# Patient Record
Sex: MALE | Race: WHITE | HISPANIC OR LATINO | Employment: OTHER | URBAN - METROPOLITAN AREA
[De-identification: names, ages, dates, MRNs, and addresses within clinical notes are randomized per-mention and may not be internally consistent; named-entity substitution may affect disease eponyms.]

---

## 2017-12-04 ENCOUNTER — HOSPITAL ENCOUNTER (EMERGENCY)
Facility: MEDICAL CENTER | Age: 67
End: 2017-12-04
Attending: EMERGENCY MEDICINE
Payer: MEDICARE

## 2017-12-04 VITALS
OXYGEN SATURATION: 92 % | WEIGHT: 126.76 LBS | DIASTOLIC BLOOD PRESSURE: 76 MMHG | BODY MASS INDEX: 24.89 KG/M2 | RESPIRATION RATE: 16 BRPM | HEIGHT: 60 IN | HEART RATE: 98 BPM | SYSTOLIC BLOOD PRESSURE: 138 MMHG | TEMPERATURE: 97.7 F

## 2017-12-04 DIAGNOSIS — R04.0 LEFT-SIDED NOSEBLEED: ICD-10-CM

## 2017-12-04 PROCEDURE — 99283 EMERGENCY DEPT VISIT LOW MDM: CPT

## 2017-12-04 ASSESSMENT — PAIN SCALES - GENERAL
PAINLEVEL_OUTOF10: 0
PAINLEVEL_OUTOF10: 0

## 2017-12-04 NOTE — ED NOTES
Discharge information provided. Pt verbalized understanding of discharge instructions to follow up with PCP and to return to ER if condition worsens. Pt ambulated out of ER in a steady gait, no additional questions or concerns. No new medications

## 2017-12-04 NOTE — ED PROVIDER NOTES
ED Provider Note    CHIEF COMPLAINT  Chief Complaint   Patient presents with   • Nose Bleed     Since last night at 2000, does not take anticoagulants         HPI    Primary care provider: Pcp Pt States None   History obtained from:Patient and his wife  History limited by: None     Louis Candelario is a 67 y.o. male who presents to the ED complaining of bleeding from the left nostril for 2 days. Wife states that he has had congestion and cough recently along with subjective fever and he started having intermittent bleeding from the left nostril. Denies any injury/trauma. Patient is not on any blood thinners. Wife reports patient with history of nosebleeds in the past. Patient denies any other symptoms.    REVIEW OF SYSTEMS  Please see HPI for pertinent positives/negatives.     PAST MEDICAL HISTORY  Past Medical History:   Diagnosis Date   • Hypertension         SURGICAL HISTORY  History reviewed. No pertinent surgical history.     SOCIAL HISTORY  Social History     Social History Main Topics   • Smoking status: Never Smoker   • Smokeless tobacco: Never Used   • Alcohol use Yes      Comment: occasional   • Drug use: No   • Sexual activity: Not on file        FAMILY HISTORY  History reviewed. No pertinent family history.     CURRENT MEDICATIONS  Home Medications     Reviewed by Dahlia Woods R.N. (Registered Nurse) on 12/04/17 at 0205  Med List Status: Unable to Obtain   Medication Last Dose Status        Patient Jose Manuel Taking any Medications                        ALLERGIES  No Known Allergies     PHYSICAL EXAM  VITAL SIGNS: /76   Pulse 98   Temp 36.5 °C (97.7 °F)   Resp 16   Ht 1.524 m (5')   Wt 57.5 kg (126 lb 12.2 oz)   SpO2 92%   BMI 24.76 kg/m²  @SHAVON[[id number]::@     Pulse ox interpretation:92% I interpret this pulse ox as borderline    Constitutional: Well developed, well nourished, alert in no apparent distress, nontoxic appearance   HENT: No external signs of trauma, normocephalic, no blood or  bleeding noted in both nares, oropharynx moist and clear without any blood or bleeding   Eyes: No discharge, no icterus   Neck: Trachea midline, no stridor   Cardiovascular: Strong distal pulses and good perfusion   Thorax & Lungs: No respiratory distress   Extremities: No clubbing, no cyanosis, no edema, no gross deformity, good ROM, no tenderness, intact distal pulses with brisk cap refill   Skin: Warm, dry, no pallor/cyanosis, no rash noted   Neuro: A/O times 3, no focal deficits noted   Psychiatric: Cooperative        DIAGNOSTIC STUDIES / PROCEDURES        LABS  All labs reviewed by me.     Results for orders placed or performed during the hospital encounter of 12/05/16   CBC WITH DIFFERENTIAL   Result Value Ref Range    WBC 5.9 4.8 - 10.8 K/uL    RBC 5.15 4.70 - 6.10 M/uL    Hemoglobin 15.8 14.0 - 18.0 g/dL    Hematocrit 46.4 42.0 - 52.0 %    MCV 90.1 81.4 - 97.8 fL    MCH 30.7 27.0 - 33.0 pg    MCHC 34.1 33.7 - 35.3 g/dL    RDW 38.5 35.9 - 50.0 fL    Platelet Count 207 164 - 446 K/uL    MPV 9.4 9.0 - 12.9 fL    Neutrophils-Polys 79.20 (H) 44.00 - 72.00 %    Lymphocytes 11.40 (L) 22.00 - 41.00 %    Monocytes 8.90 0.00 - 13.40 %    Eosinophils 0.00 0.00 - 6.90 %    Basophils 0.30 0.00 - 1.80 %    Immature Granulocytes 0.20 0.00 - 0.90 %    Nucleated RBC 0.00 /100 WBC    Neutrophils (Absolute) 4.64 1.82 - 7.42 K/uL    Lymphs (Absolute) 0.67 (L) 1.00 - 4.80 K/uL    Monos (Absolute) 0.52 0.00 - 0.85 K/uL    Eos (Absolute) 0.00 0.00 - 0.51 K/uL    Baso (Absolute) 0.02 0.00 - 0.12 K/uL    Immature Granulocytes (abs) 0.01 0.00 - 0.11 K/uL    NRBC (Absolute) 0.00 K/uL   COMP METABOLIC PANEL   Result Value Ref Range    Sodium 138 135 - 145 mmol/L    Potassium 3.3 (L) 3.6 - 5.5 mmol/L    Chloride 101 96 - 112 mmol/L    Co2 24 20 - 33 mmol/L    Anion Gap 13.0 (H) 0.0 - 11.9    Glucose 101 (H) 65 - 99 mg/dL    Bun 8 8 - 22 mg/dL    Creatinine 0.93 0.50 - 1.40 mg/dL    Calcium 9.0 8.4 - 10.2 mg/dL    AST(SGOT) 23 12 - 45  U/L    ALT(SGPT) 16 2 - 50 U/L    Alkaline Phosphatase 93 30 - 99 U/L    Total Bilirubin 0.6 0.1 - 1.5 mg/dL    Albumin 4.5 3.2 - 4.9 g/dL    Total Protein 8.2 6.0 - 8.2 g/dL    Globulin 3.7 (H) 1.9 - 3.5 g/dL    A-G Ratio 1.2 g/dL   TROPONIN   Result Value Ref Range    Troponin I <0.02 0.00 - 0.04 ng/mL   BTYPE NATRIURETIC PEPTIDE   Result Value Ref Range    B Natriuretic Peptide 21 0 - 100 pg/mL   BLOOD CULTURE x2   Result Value Ref Range    Significant Indicator NEG     Source BLD     Site PERIPHERAL     Blood Culture No growth after 5 days of incubation.    BLOOD CULTURE x2   Result Value Ref Range    Significant Indicator NEG     Source BLD     Site PERIPHERAL     Blood Culture No growth after 5 days of incubation.    INFLUENZA RAPID   Result Value Ref Range    Significant Indicator NEG     Source RESP     Site RESPIRATORY Nasopharyngeal     Rapid Influenza A-B       Negative for Influenza A and Influenza B antigens.  Infection due to influenza A or B cannot be ruled out  since the antigen present in the specimen may be below the  detection limit of the test. Culture confirmation of  negative samples is recommended.     LACTIC ACID   Result Value Ref Range    Lactic Acid 2.93 (H) 0.50 - 2.00 mmol/L    Specimen Venous    ESTIMATED GFR   Result Value Ref Range    GFR If African American >60 >60 mL/min/1.73 m 2    GFR If Non African American >60 >60 mL/min/1.73 m 2   EKG (ER)   Result Value Ref Range    Report       [facility] Emergency Dept.    Test Date:  2016  Pt Name:    KENNY CULP                Department: [facility]  MRN:        [id number]                      [facility]  Gender:     M                            Technician: GEORGE  :        1950                   Requested By:NORA RAMIREZ  Order #:    [id number]                    Reading MD: NORA RAMIREZ MD    Measurements  Intervals                                Axis  Rate:       95                            P:          37  PA:         176                          QRS:        29  QRSD:       92                           T:          18  QT:         360  QTc:        453    Interpretive Statements  SINUS RHYTHM  No previous ECG available for comparison    Electronically Signed On 12-5-2016 16:03:56 PST by ONRA RAMIREZ MD          RADIOLOGY  The radiologist's interpretation of all radiological studies have been reviewed by me.     No orders to display          COURSE & MEDICAL DECISION MAKING  Nursing notes, VS, PMSFHx reviewed in chart.       Patient presents with his wife to the ED with above complaint. Nasal clamp was applied by triage nurse. On my exam, the patient with no blood or bleeding in both nares. Findings discussed with the patient and his wife. He appears to be having URI symptoms which is likely causing his intermittent nosebleeds. He will be sent home with a nasal clamp and I discussed with them home treatment for epistaxis. They were given return to ED precautions. Patient otherwise noted to be in no acute distress and nontoxic in appearance.  They verbalized understanding and agreed with plan of care with no further questions or concerns.      The patient is referred to a primary physician for blood pressure management, diabetic screening, and for all other preventative health concerns.       FINAL IMPRESSION  1. Left-sided nosebleed           DISPOSITION  Patient will be discharged home in stable condition.       FOLLOW UP  [address] [address] [phone]  Call in 1 day      [facility], Emergency Dept  [address]  [facility] [id number] [phone]    If symptoms worsen         OUTPATIENT MEDICATIONS  New Prescriptions    No medications on file          Electronically signed by: Devon Lagos, 12/4/2017 2:12 AM      Portions of this record were made with voice recognition software.  Despite my review, spelling/grammar/context  errors may still remain.  Interpretation of this chart should be taken in this context.

## 2017-12-04 NOTE — ED NOTES
Chief Complaint   Patient presents with   • Nose Bleed     Since last night at 2000, does not take anticoagulants

## 2019-07-15 ENCOUNTER — NON-PROVIDER VISIT (OUTPATIENT)
Dept: OCCUPATIONAL MEDICINE | Facility: CLINIC | Age: 69
End: 2019-07-15

## 2019-07-15 DIAGNOSIS — Z11.1 ENCOUNTER FOR PPD TEST: Primary | ICD-10-CM

## 2019-07-15 PROCEDURE — 86580 TB INTRADERMAL TEST: CPT | Performed by: PREVENTIVE MEDICINE

## 2019-07-17 ENCOUNTER — NON-PROVIDER VISIT (OUTPATIENT)
Dept: OCCUPATIONAL MEDICINE | Facility: CLINIC | Age: 69
End: 2019-07-17

## 2019-07-17 DIAGNOSIS — Z11.1 ENCOUNTER FOR PPD SKIN TEST READING: ICD-10-CM

## 2019-07-17 LAB — TB WHEAL 3D P 5 TU DIAM: NORMAL MM

## 2019-11-11 ENCOUNTER — HOSPITAL ENCOUNTER (EMERGENCY)
Facility: MEDICAL CENTER | Age: 69
End: 2019-11-11
Attending: EMERGENCY MEDICINE
Payer: MEDICARE

## 2019-11-11 VITALS
TEMPERATURE: 98.5 F | HEIGHT: 60 IN | BODY MASS INDEX: 25.02 KG/M2 | RESPIRATION RATE: 16 BRPM | OXYGEN SATURATION: 97 % | WEIGHT: 127.43 LBS | SYSTOLIC BLOOD PRESSURE: 119 MMHG | HEART RATE: 82 BPM | DIASTOLIC BLOOD PRESSURE: 85 MMHG

## 2019-11-11 DIAGNOSIS — R04.0 EPISTAXIS: ICD-10-CM

## 2019-11-11 PROCEDURE — 700101 HCHG RX REV CODE 250

## 2019-11-11 PROCEDURE — 700102 HCHG RX REV CODE 250 W/ 637 OVERRIDE(OP)

## 2019-11-11 PROCEDURE — 99283 EMERGENCY DEPT VISIT LOW MDM: CPT

## 2019-11-11 PROCEDURE — 303620 HCHG EPISTAXIS CONTROL

## 2019-11-11 PROCEDURE — A9270 NON-COVERED ITEM OR SERVICE: HCPCS

## 2019-11-11 RX ORDER — EPINEPHRINE NASAL SOLUTION 1 MG/ML
SOLUTION NASAL
Status: COMPLETED
Start: 2019-11-11 | End: 2019-11-11

## 2019-11-11 RX ORDER — LIDOCAINE HYDROCHLORIDE 20 MG/ML
JELLY TOPICAL
Status: COMPLETED
Start: 2019-11-11 | End: 2019-11-11

## 2019-11-11 RX ADMIN — BENZOCAINE, BUTAMBEN, AND TETRACAINE HYDROCHLORIDE: .028; .004; .004 AEROSOL, SPRAY TOPICAL at 18:30

## 2019-11-11 RX ADMIN — Medication: at 19:20

## 2019-11-11 RX ADMIN — Medication: at 18:30

## 2019-11-12 NOTE — ED TRIAGE NOTES
Louis Candelario  69 y.o.  Chief Complaint   Patient presents with   • Nose Bleed     states always has minor bleeding in the morning, but today has been increased all day, had a sx on nose many years ago to correct bleeding     Patient ambulatory with steady gait to triage room with above complaint.  Patient appears in mild discomfort, has nasal clip in place, no leaking noted.  Presents with bag of blood tissues.  Denies pain    Patient escorted to the lobby and instructed to notify staff of any changes in condition.

## 2019-11-12 NOTE — ED NOTES
Pt discharged home. Explained discharge and medication instructions. Questions and comments addressed. Pt verbalized understanding of instructions. Pt advised to follow-up with ENT or return to ED for any new or worsening of symptoms. Pt is ambulating well and steady on feet. VS stable. Pt's family at bedside and will be driving pt home.

## 2019-11-12 NOTE — ED PROVIDER NOTES
ED Provider Note    Scribed for Armida Oconnell M.D. by Yrn Cottrell. 11/11/2019, 6:11 PM.    Primary care provider: Pcp Pt States None  Means of arrival: Walk In  History obtained from: Patient  History limited by: None    CHIEF COMPLAINT  Chief Complaint   Patient presents with   • Nose Bleed     states always has minor bleeding in the morning, but today has been increased all day, had a sx on nose many years ago to correct bleeding       HPI  Louis Candelario is a 69 y.o. male who presents to the Emergency Department for evaluation of a nose bleed which onset earlier this morning however has persisted throughout the day. Patient had a cauterization procedure performed 15-20 years ago, and has not had any problems with nosebleeds since. For the last week he has been having stuttering bleeding, and today was unable to stop the bleeding. The right nare started bleeding just before 7 AM, and upon arrival here, both nares were noted to be bleeding. Patient has had a nasal clamp in place for the last hour. Given the persistent bleeding, he has come here for evaluation and treatment. He denies any lightheadedness, syncope, nausea, and vomiting, and is not anticoagulated.    REVIEW OF SYSTEMS  Pertinent positives include epistaxis,.   Pertinent negatives include no lightheadedness, syncope, nausea, vomiting.    See HPI for further details.     PAST MEDICAL HISTORY  Past Medical History:   Diagnosis Date   • Hypertension        SURGICAL HISTORY  Past Surgical History:   Procedure Laterality Date   • HERNIA REPAIR     • NASAL CAUTERIZATION         SOCIAL HISTORY  Social History     Tobacco Use   • Smoking status: Never Smoker   • Smokeless tobacco: Never Used   Substance Use Topics   • Alcohol use: Yes     Comment: occasional   • Drug use: No      Social History     Substance and Sexual Activity   Drug Use No       FAMILY HISTORY  History reviewed. No pertinent family history.    CURRENT  MEDICATIONS  None      ALLERGIES  No Known Allergies    PHYSICAL EXAM  VITAL SIGNS: /89   Pulse 87   Temp 36.8 °C (98.2 °F) (Temporal)   Resp 16   Ht 1.524 m (5')   Wt 57.8 kg (127 lb 6.8 oz)   SpO2 96%   BMI 24.89 kg/m²   Vitals reviewed.    Consitutional: Well-developed, well-nourished. Negative for: distress.  HENT: Normocephalic, right external ear normal, left external ear normal, oropharynx clear and moist. Right medial septum area of excoriation that is not currently oozing, no blood in the posterior oropharynx at this time  Eyes: Conjunctivae normal, extraocular movements normal. Negative for: discharge in right and left eye, icterus.  Neck: Range of motion normal, supple.   Musculoskeletal: Normal range of motion. Negative for edema.  Neurological: Alert and oriented x3. No focal deficits.  Skin: Warm, dry. Negative for rash.  Psych: Mood/affect normal, behavior normal, judgment normal.      DIAGNOSTIC STUDIES / PROCEDURES    Epistaxis Procedure Note    Indication: Bleeding    Pre-medication: viscous lidocaine    Procedure: The patient was positioned appropriately and the nares were cleared as well as possible. The bleeding site was localized to the right nare in the middle region and tamponaded with a Merocel nasal tampon after silver nitrate failed.  Hemostasis was obtained.    The patient tolerated the procedure well.    Complications: None      COURSE & MEDICAL DECISION MAKING  Nursing notes, VS, PMSFHx reviewed in chart.    6:11 PM Patient seen and examined at bedside. The patient presents with nosebleeding despite clamping. Epistaxis procedure performed by myself at this time.    7:35 PM  - Patient reassessed for fourth time, who is resting comfortably and is no longer bleeding. The plan outpatient care was discussed and the patient will be discharged home. He understands and agrees.    The patient will return for new or worsening symptoms and is stable at the time of  discharge.    DISPOSITION:  Patient will be discharged home in stable condition.    FOLLOW UP:  Simba Harris M.D.  [address] [facility] [address]  [phone]    Schedule an appointment as soon as possible for a visit       [facility], Emergency Dept  [address] [facility] [address] [phone]    As needed, If symptoms worsen      FINAL IMPRESSION  1. Epistaxis          IYrn (Scribe), am scribing for, and in the presence of, Armida Oconnell M.D..    Electronically signed by: Yrn Cottrell (Scribe), 11/11/2019    Armida BRUMFIELD M.D. personally performed the services described in this documentation, as scribed by Yrn Cottrell in my presence, and it is both accurate and complete. E.    The note accurately reflects work and decisions made by me.  Armida Oconnell  11/11/2019  7:35 PM

## 2019-11-14 ENCOUNTER — HOSPITAL ENCOUNTER (EMERGENCY)
Facility: MEDICAL CENTER | Age: 69
End: 2019-11-14
Attending: EMERGENCY MEDICINE
Payer: MEDICARE

## 2019-11-14 ENCOUNTER — APPOINTMENT (OUTPATIENT)
Dept: RADIOLOGY | Facility: MEDICAL CENTER | Age: 69
End: 2019-11-14
Attending: EMERGENCY MEDICINE
Payer: MEDICARE

## 2019-11-14 VITALS
DIASTOLIC BLOOD PRESSURE: 56 MMHG | OXYGEN SATURATION: 94 % | TEMPERATURE: 98.8 F | RESPIRATION RATE: 26 BRPM | HEIGHT: 60 IN | HEART RATE: 82 BPM | SYSTOLIC BLOOD PRESSURE: 116 MMHG | WEIGHT: 124.56 LBS | BODY MASS INDEX: 24.45 KG/M2

## 2019-11-14 DIAGNOSIS — J18.9 PNEUMONIA DUE TO INFECTIOUS ORGANISM, UNSPECIFIED LATERALITY, UNSPECIFIED PART OF LUNG: ICD-10-CM

## 2019-11-14 DIAGNOSIS — R05.9 COUGH: ICD-10-CM

## 2019-11-14 LAB
ALBUMIN SERPL BCP-MCNC: 4.4 G/DL (ref 3.2–4.9)
ALBUMIN/GLOB SERPL: 1.8 G/DL
ALP SERPL-CCNC: 70 U/L (ref 30–99)
ALT SERPL-CCNC: 8 U/L (ref 2–50)
ANION GAP SERPL CALC-SCNC: 11 MMOL/L (ref 0–11.9)
APPEARANCE UR: CLEAR
AST SERPL-CCNC: 15 U/L (ref 12–45)
BACTERIA #/AREA URNS HPF: NEGATIVE /HPF
BASOPHILS # BLD AUTO: 0.4 % (ref 0–1.8)
BASOPHILS # BLD: 0.03 K/UL (ref 0–0.12)
BILIRUB SERPL-MCNC: 1.1 MG/DL (ref 0.1–1.5)
BILIRUB UR QL STRIP.AUTO: NEGATIVE
BUN SERPL-MCNC: 12 MG/DL (ref 8–22)
CALCIUM SERPL-MCNC: 8.8 MG/DL (ref 8.5–10.5)
CHLORIDE SERPL-SCNC: 107 MMOL/L (ref 96–112)
CO2 SERPL-SCNC: 23 MMOL/L (ref 20–33)
COLOR UR: ABNORMAL
CREAT SERPL-MCNC: 0.91 MG/DL (ref 0.5–1.4)
EOSINOPHIL # BLD AUTO: 0.01 K/UL (ref 0–0.51)
EOSINOPHIL NFR BLD: 0.1 % (ref 0–6.9)
EPI CELLS #/AREA URNS HPF: NEGATIVE /HPF
ERYTHROCYTE [DISTWIDTH] IN BLOOD BY AUTOMATED COUNT: 39.1 FL (ref 35.9–50)
FLUAV RNA SPEC QL NAA+PROBE: NEGATIVE
FLUBV RNA SPEC QL NAA+PROBE: NEGATIVE
GLOBULIN SER CALC-MCNC: 2.5 G/DL (ref 1.9–3.5)
GLUCOSE SERPL-MCNC: 119 MG/DL (ref 65–99)
GLUCOSE UR STRIP.AUTO-MCNC: NEGATIVE MG/DL
HCT VFR BLD AUTO: 38.7 % (ref 42–52)
HGB BLD-MCNC: 13.1 G/DL (ref 14–18)
HYALINE CASTS #/AREA URNS LPF: ABNORMAL /LPF
IMM GRANULOCYTES # BLD AUTO: 0.04 K/UL (ref 0–0.11)
IMM GRANULOCYTES NFR BLD AUTO: 0.5 % (ref 0–0.9)
KETONES UR STRIP.AUTO-MCNC: 15 MG/DL
LACTATE BLD-SCNC: 1.2 MMOL/L (ref 0.5–2)
LEUKOCYTE ESTERASE UR QL STRIP.AUTO: ABNORMAL
LYMPHOCYTES # BLD AUTO: 0.86 K/UL (ref 1–4.8)
LYMPHOCYTES NFR BLD: 10.1 % (ref 22–41)
MCH RBC QN AUTO: 31.1 PG (ref 27–33)
MCHC RBC AUTO-ENTMCNC: 33.9 G/DL (ref 33.7–35.3)
MCV RBC AUTO: 91.9 FL (ref 81.4–97.8)
MICRO URNS: ABNORMAL
MONOCYTES # BLD AUTO: 0.63 K/UL (ref 0–0.85)
MONOCYTES NFR BLD AUTO: 7.4 % (ref 0–13.4)
NEUTROPHILS # BLD AUTO: 6.98 K/UL (ref 1.82–7.42)
NEUTROPHILS NFR BLD: 81.5 % (ref 44–72)
NITRITE UR QL STRIP.AUTO: NEGATIVE
NRBC # BLD AUTO: 0 K/UL
NRBC BLD-RTO: 0 /100 WBC
NT-PROBNP SERPL IA-MCNC: 93 PG/ML (ref 0–125)
PH UR STRIP.AUTO: 6.5 [PH] (ref 5–8)
PLATELET # BLD AUTO: 213 K/UL (ref 164–446)
PMV BLD AUTO: 9.6 FL (ref 9–12.9)
POTASSIUM SERPL-SCNC: 3.7 MMOL/L (ref 3.6–5.5)
PROCALCITONIN SERPL-MCNC: 0.06 NG/ML
PROT SERPL-MCNC: 6.9 G/DL (ref 6–8.2)
PROT UR QL STRIP: 100 MG/DL
RBC # BLD AUTO: 4.21 M/UL (ref 4.7–6.1)
RBC # URNS HPF: ABNORMAL /HPF
RBC UR QL AUTO: NEGATIVE
SODIUM SERPL-SCNC: 141 MMOL/L (ref 135–145)
SP GR UR STRIP.AUTO: 1.03
UROBILINOGEN UR STRIP.AUTO-MCNC: 1 MG/DL
WBC # BLD AUTO: 8.6 K/UL (ref 4.8–10.8)
WBC #/AREA URNS HPF: ABNORMAL /HPF

## 2019-11-14 PROCEDURE — 83880 ASSAY OF NATRIURETIC PEPTIDE: CPT

## 2019-11-14 PROCEDURE — 700111 HCHG RX REV CODE 636 W/ 250 OVERRIDE (IP): Performed by: EMERGENCY MEDICINE

## 2019-11-14 PROCEDURE — 99285 EMERGENCY DEPT VISIT HI MDM: CPT

## 2019-11-14 PROCEDURE — 81001 URINALYSIS AUTO W/SCOPE: CPT

## 2019-11-14 PROCEDURE — 700102 HCHG RX REV CODE 250 W/ 637 OVERRIDE(OP): Performed by: EMERGENCY MEDICINE

## 2019-11-14 PROCEDURE — 87502 INFLUENZA DNA AMP PROBE: CPT

## 2019-11-14 PROCEDURE — A9270 NON-COVERED ITEM OR SERVICE: HCPCS | Performed by: EMERGENCY MEDICINE

## 2019-11-14 PROCEDURE — 96365 THER/PROPH/DIAG IV INF INIT: CPT

## 2019-11-14 PROCEDURE — 700105 HCHG RX REV CODE 258: Performed by: EMERGENCY MEDICINE

## 2019-11-14 PROCEDURE — 96375 TX/PRO/DX INJ NEW DRUG ADDON: CPT

## 2019-11-14 PROCEDURE — 83605 ASSAY OF LACTIC ACID: CPT

## 2019-11-14 PROCEDURE — 87086 URINE CULTURE/COLONY COUNT: CPT

## 2019-11-14 PROCEDURE — 84145 PROCALCITONIN (PCT): CPT

## 2019-11-14 PROCEDURE — 87040 BLOOD CULTURE FOR BACTERIA: CPT | Mod: 91

## 2019-11-14 PROCEDURE — 80053 COMPREHEN METABOLIC PANEL: CPT

## 2019-11-14 PROCEDURE — 36415 COLL VENOUS BLD VENIPUNCTURE: CPT

## 2019-11-14 PROCEDURE — 71045 X-RAY EXAM CHEST 1 VIEW: CPT

## 2019-11-14 PROCEDURE — 85025 COMPLETE CBC W/AUTO DIFF WBC: CPT

## 2019-11-14 RX ORDER — AZITHROMYCIN 500 MG/1
500 INJECTION, POWDER, LYOPHILIZED, FOR SOLUTION INTRAVENOUS ONCE
Status: COMPLETED | OUTPATIENT
Start: 2019-11-14 | End: 2019-11-14

## 2019-11-14 RX ORDER — ACETAMINOPHEN 325 MG/1
650 TABLET ORAL ONCE
Status: COMPLETED | OUTPATIENT
Start: 2019-11-14 | End: 2019-11-14

## 2019-11-14 RX ORDER — AZITHROMYCIN 250 MG/1
TABLET, FILM COATED ORAL
Qty: 6 TAB | Refills: 0 | Status: SHIPPED | OUTPATIENT
Start: 2019-11-14 | End: 2020-01-01

## 2019-11-14 RX ADMIN — CEFTRIAXONE SODIUM 1 G: 1 INJECTION, POWDER, FOR SOLUTION INTRAMUSCULAR; INTRAVENOUS at 10:39

## 2019-11-14 RX ADMIN — AZITHROMYCIN MONOHYDRATE 500 MG: 500 INJECTION, POWDER, LYOPHILIZED, FOR SOLUTION INTRAVENOUS at 10:15

## 2019-11-14 RX ADMIN — ACETAMINOPHEN 650 MG: 325 TABLET, FILM COATED ORAL at 09:56

## 2019-11-14 NOTE — ED TRIAGE NOTES
"Louis Candelario 69 y.o. male ambulatory w/ slow steady gait to triage with daughter for     Chief Complaint   Patient presents with   • Epistaxis     pt's daughter reports patient seen here 2 days ago for epistaxis and was cauterized and d/c'd with referral to ENT, who has not called them back yet.  Daughter reports intermittent nosebleeds since 2 days ago.   • Cough     daughter reports patient was awake all night because he cannot lay down or has \"cough attacks\" until he is coughing up blood   • Blood in Sputum   • Weakness     daughter reports patient is taking very little food or fluid c/o nausea.  She reports he has eaten very little in 3 days   • Nausea   • Headache     /72   Pulse 99   Temp 37.5 °C (99.5 °F) (Temporal)   Resp 20   Ht 1.524 m (5')   Wt 56.5 kg (124 lb 9 oz)   SpO2 94%   BMI 24.33 kg/m²    Pt Brazilian speaking, requesting daughter to translate rather than language ipad.  Pt placed in the senior lobby to await bed assignment.  Advised to return to the triage desk for any changes/concerns.  "

## 2019-11-14 NOTE — ED NOTES
Patient arrives complaining of 3 day history of nausea and headache. Reports was seen recently for nosebleed and discharged home. Poor appetite per family.

## 2019-11-14 NOTE — ED PROVIDER NOTES
"ED Provider Note    CHIEF COMPLAINT  Chief Complaint   Patient presents with   • Epistaxis     pt's daughter reports patient seen here 2 days ago for epistaxis and was cauterized and d/c'd with referral to ENT, who has not called them back yet.  Daughter reports intermittent nosebleeds since 2 days ago.   • Cough     daughter reports patient was awake all night because he cannot lay down or has \"cough attacks\" until he is coughing up blood   • Blood in Sputum   • Weakness     daughter reports patient is taking very little food or fluid c/o nausea.  She reports he has eaten very little in 3 days   • Nausea   • Headache       HPI  Louis Candelario is a 69 y.o. male who presents to the emergency department multiple complaints.  Patient was here couple days ago for epistaxis.  This was treated and packed.  The patient was discharged home but reports and the fact that has fallen out.  The patient has had a cough and cold symptoms for the last several days.  The patient has had a cough.  This is been associate with shortness of breath.  Cough is productive of yellow sputum.  He had some associated fevers and chills.  He has fairly significant shortness of breath when he walks.  He feels achy diffusely.  He denies any chest discomfort.  No nausea vomiting abdominal pain.  No other aggravating leaving factors or associated complaints.  Denies any history of PE DVT or heart disease.      The patient speaks Occitan.  His family served as an .  I have offered the [employer] language line however they refused insisting to be the patient's .    REVIEW OF SYSTEMS  See HPI for further details. All other systems are negative.    PAST MEDICAL HISTORY  Past Medical History:   Diagnosis Date   • Hypertension        FAMILY HISTORY  No family history on file.    SOCIAL HISTORY  Social History     Socioeconomic History   • Marital status: Legally      Spouse name: Not on file   • Number of children: Not on " file   • Years of education: Not on file   • Highest education level: Not on file   Occupational History   • Not on file   Social Needs   • Financial resource strain: Not on file   • Food insecurity:     Worry: Not on file     Inability: Not on file   • Transportation needs:     Medical: Not on file     Non-medical: Not on file   Tobacco Use   • Smoking status: Never Smoker   • Smokeless tobacco: Never Used   Substance and Sexual Activity   • Alcohol use: Yes     Comment: occasional   • Drug use: No   • Sexual activity: Not on file   Lifestyle   • Physical activity:     Days per week: Not on file     Minutes per session: Not on file   • Stress: Not on file   Relationships   • Social connections:     Talks on phone: Not on file     Gets together: Not on file     Attends Rastafarian service: Not on file     Active member of club or organization: Not on file     Attends meetings of clubs or organizations: Not on file     Relationship status: Not on file   • Intimate partner violence:     Fear of current or ex partner: Not on file     Emotionally abused: Not on file     Physically abused: Not on file     Forced sexual activity: Not on file   Other Topics Concern   • Not on file   Social History Narrative   • Not on file       SURGICAL HISTORY  Past Surgical History:   Procedure Laterality Date   • HERNIA REPAIR     • NASAL CAUTERIZATION         CURRENT MEDICATIONS  Home Medications    **Home medications have not yet been reviewed for this encounter**     Patient takes medicine for hypertension.  Does not recall the name    ALLERGIES  No Known Allergies    PHYSICAL EXAM  VITAL SIGNS: /72   Pulse 99   Temp (!) 38.4 °C (101.2 °F) (Oral)   Resp 20   Ht 1.524 m (5')   Wt 56.5 kg (124 lb 9 oz)   SpO2 94%   BMI 24.33 kg/m²    Constitutional: Well developed, Well nourished, No acute distress, Non-toxic appearance.   HENT: Normocephalic, Atraumatic, Bilateral external ears normal, Oropharynx moist, No oral exudates,  Nose normal.  Pharyngeal erythema no exudates  Eyes: PERRL, EOMI, Conjunctiva normal, No discharge.   Neck: Normal range of motion, No tenderness, Supple, No stridor.   Cardiovascular: Normal heart rate, Normal rhythm, No murmurs, No rubs, No gallops.   Thorax & Lungs: Crackles in both bases.  Good air movement  Abdomen: Bowel sounds normal, Soft, No tenderness   Skin: Warm, Dry, No erythema, No rash.   Back: No tenderness, No CVA tenderness.   Musculoskeletal: Good range of motion in all major joints.  Neurologic: Alert & oriented x 3, No focal deficits noted.   Psychiatric: Affect normal      Results for orders placed or performed during the hospital encounter of 11/14/19   LACTIC ACID   Result Value Ref Range    Lactic Acid 1.2 0.5 - 2.0 mmol/L   CBC WITH DIFFERENTIAL   Result Value Ref Range    WBC 8.6 4.8 - 10.8 K/uL    RBC 4.21 (L) 4.70 - 6.10 M/uL    Hemoglobin 13.1 (L) 14.0 - 18.0 g/dL    Hematocrit 38.7 (L) 42.0 - 52.0 %    MCV 91.9 81.4 - 97.8 fL    MCH 31.1 27.0 - 33.0 pg    MCHC 33.9 33.7 - 35.3 g/dL    RDW 39.1 35.9 - 50.0 fL    Platelet Count 213 164 - 446 K/uL    MPV 9.6 9.0 - 12.9 fL    Neutrophils-Polys 81.50 (H) 44.00 - 72.00 %    Lymphocytes 10.10 (L) 22.00 - 41.00 %    Monocytes 7.40 0.00 - 13.40 %    Eosinophils 0.10 0.00 - 6.90 %    Basophils 0.40 0.00 - 1.80 %    Immature Granulocytes 0.50 0.00 - 0.90 %    Nucleated RBC 0.00 /100 WBC    Neutrophils (Absolute) 6.98 1.82 - 7.42 K/uL    Lymphs (Absolute) 0.86 (L) 1.00 - 4.80 K/uL    Monos (Absolute) 0.63 0.00 - 0.85 K/uL    Eos (Absolute) 0.01 0.00 - 0.51 K/uL    Baso (Absolute) 0.03 0.00 - 0.12 K/uL    Immature Granulocytes (abs) 0.04 0.00 - 0.11 K/uL    NRBC (Absolute) 0.00 K/uL   COMP METABOLIC PANEL   Result Value Ref Range    Sodium 141 135 - 145 mmol/L    Potassium 3.7 3.6 - 5.5 mmol/L    Chloride 107 96 - 112 mmol/L    Co2 23 20 - 33 mmol/L    Anion Gap 11.0 0.0 - 11.9    Glucose 119 (H) 65 - 99 mg/dL    Bun 12 8 - 22 mg/dL    Creatinine  0.91 0.50 - 1.40 mg/dL    Calcium 8.8 8.5 - 10.5 mg/dL    AST(SGOT) 15 12 - 45 U/L    ALT(SGPT) 8 2 - 50 U/L    Alkaline Phosphatase 70 30 - 99 U/L    Total Bilirubin 1.1 0.1 - 1.5 mg/dL    Albumin 4.4 3.2 - 4.9 g/dL    Total Protein 6.9 6.0 - 8.2 g/dL    Globulin 2.5 1.9 - 3.5 g/dL    A-G Ratio 1.8 g/dL   URINALYSIS   Result Value Ref Range    Color DK Yellow     Character Clear     Specific Gravity 1.034 <1.035    Ph 6.5 5.0 - 8.0    Glucose Negative Negative mg/dL    Ketones 15 (A) Negative mg/dL    Protein 100 (A) Negative mg/dL    Bilirubin Negative Negative    Urobilinogen, Urine 1.0 Negative    Nitrite Negative Negative    Leukocyte Esterase Trace (A) Negative    Occult Blood Negative Negative    Micro Urine Req Microscopic    Influenza By PCR, A/B   Result Value Ref Range    Influenza virus A RNA Negative Negative    Influenza virus B, PCR Negative Negative   proBrain Natriuretic Peptide, NT   Result Value Ref Range    NT-proBNP 93 0 - 125 pg/mL   ESTIMATED GFR   Result Value Ref Range    GFR If African American >60 >60 mL/min/1.73 m 2    GFR If Non African American >60 >60 mL/min/1.73 m 2   PROCALCITONIN   Result Value Ref Range    Procalcitonin 0.06 <0.25 ng/mL   URINE MICROSCOPIC (W/UA)   Result Value Ref Range    WBC 2-5 (A) /hpf    RBC 5-10 (A) /hpf    Bacteria Negative None /hpf    Epithelial Cells Negative /hpf    Hyaline Cast 6-10 (A) /lpf         RADIOLOGY/PROCEDURES  DX-CHEST-PORTABLE (1 VIEW)   Final Result      No acute cardiac or pulmonary abnormality is noted.            COURSE & MEDICAL DECISION MAKING  Pertinent Labs & Imaging studies reviewed. (See chart for details)  Patient presents emergency department complaint of cough, shortness of breath and cold symptoms.    Differential diagnosis includes but is not limited to URI, viral illness, pneumonia, less likely CHF.    The patient has a cough productive of yellow sputum.  Patient's labs are reassuring does not have a significant leukocytosis  or concerning differential.  His lactic acid is normal.  Metabolic panel is normal.  Procalcitonin is normal.  BNP, urinalysis is negative.  Influenza swabs are negative.       Patient was empirically treated for possible pneumonia for some so that he might be septic.  Labs are reassuring as no indication of sepsis.  He is able to walk around the emergency department without signs of increased shortness of breath or other illness.  He is feeling much better on reassessment    At this point I do think he requires admission that he can be a treated as an outpatient.    Despite a negative x-ray he does have few crackles in bases.  We will treat him as possible pneumonia.  We will put him on a Z-Vahid.  He is to return for worsening cough, shortness of breath fevers chills ill appearance or other concerns.    He is reassessed, he is feeling better and the family is going to go home the patient is comfortable going home.  He is not tachycardic.  He is not hypoxemic.      At this point he is feeling better he will be discharged home.  Recommend over-the-counter ibuprofen for pain.    The patient will be discharged home.  Refer to [facility].  He will return if he is worse.  Questions were answered.  Agreeable to plan.    Given discharge instructions to manage the epistaxis.  Right now he is anemic is not been bleeding.  Does not require packing.  We will have him put on a bicarbonate nasal septum.  Questions were answered.  Agreeable to plan.  Discharged with close return precautions and discharged in good condition         The patient was noted to have elevated blood pressure while in the ER and was counseled to see their doctor within one wee to have this rechecked.        FINAL IMPRESSION  1. Cough     2. Pneumonia due to infectious organism, unspecified laterality, unspecified part of lung         2.   3.         Electronically signed by: Grupo Vogt, 11/14/2019 9:03 AM

## 2019-11-14 NOTE — ED NOTES
Pt given discharge instructions/prescription given/ home care instructions explained/epistaxis care explained, pt verbalized understanding of instructions given/pt understands the importance of follow up, pt ambulatory to ER jigna.

## 2019-11-16 LAB
BACTERIA UR CULT: NORMAL
SIGNIFICANT IND 70042: NORMAL
SITE SITE: NORMAL
SOURCE SOURCE: NORMAL

## 2019-11-19 LAB
BACTERIA BLD CULT: NORMAL
BACTERIA BLD CULT: NORMAL
SIGNIFICANT IND 70042: NORMAL
SIGNIFICANT IND 70042: NORMAL
SITE SITE: NORMAL
SITE SITE: NORMAL
SOURCE SOURCE: NORMAL
SOURCE SOURCE: NORMAL

## 2020-01-01 ENCOUNTER — APPOINTMENT (OUTPATIENT)
Dept: RADIOLOGY | Facility: MEDICAL CENTER | Age: 70
End: 2020-01-01
Attending: EMERGENCY MEDICINE
Payer: MEDICARE

## 2020-01-01 ENCOUNTER — HOSPITAL ENCOUNTER (EMERGENCY)
Facility: MEDICAL CENTER | Age: 70
End: 2020-01-01
Attending: EMERGENCY MEDICINE
Payer: MEDICARE

## 2020-01-01 VITALS
RESPIRATION RATE: 20 BRPM | HEIGHT: 62 IN | WEIGHT: 120.59 LBS | HEART RATE: 105 BPM | DIASTOLIC BLOOD PRESSURE: 58 MMHG | BODY MASS INDEX: 22.19 KG/M2 | OXYGEN SATURATION: 93 % | TEMPERATURE: 103 F | SYSTOLIC BLOOD PRESSURE: 104 MMHG

## 2020-01-01 DIAGNOSIS — J11.1 INFLUENZA: ICD-10-CM

## 2020-01-01 LAB
ALBUMIN SERPL BCP-MCNC: 4.6 G/DL (ref 3.2–4.9)
ALBUMIN/GLOB SERPL: 1.4 G/DL
ALP SERPL-CCNC: 83 U/L (ref 30–99)
ALT SERPL-CCNC: 14 U/L (ref 2–50)
ANION GAP SERPL CALC-SCNC: 13 MMOL/L (ref 0–11.9)
AST SERPL-CCNC: 18 U/L (ref 12–45)
BASOPHILS # BLD AUTO: 0.2 % (ref 0–1.8)
BASOPHILS # BLD: 0.01 K/UL (ref 0–0.12)
BILIRUB SERPL-MCNC: 0.5 MG/DL (ref 0.1–1.5)
BUN SERPL-MCNC: 15 MG/DL (ref 8–22)
CALCIUM SERPL-MCNC: 9.2 MG/DL (ref 8.5–10.5)
CHLORIDE SERPL-SCNC: 102 MMOL/L (ref 96–112)
CO2 SERPL-SCNC: 23 MMOL/L (ref 20–33)
CREAT SERPL-MCNC: 1.24 MG/DL (ref 0.5–1.4)
EOSINOPHIL # BLD AUTO: 0 K/UL (ref 0–0.51)
EOSINOPHIL NFR BLD: 0 % (ref 0–6.9)
ERYTHROCYTE [DISTWIDTH] IN BLOOD BY AUTOMATED COUNT: 37.7 FL (ref 35.9–50)
FLUAV RNA SPEC QL NAA+PROBE: POSITIVE
FLUBV RNA SPEC QL NAA+PROBE: NEGATIVE
GLOBULIN SER CALC-MCNC: 3.3 G/DL (ref 1.9–3.5)
GLUCOSE SERPL-MCNC: 112 MG/DL (ref 65–99)
HCT VFR BLD AUTO: 42 % (ref 42–52)
HGB BLD-MCNC: 14.5 G/DL (ref 14–18)
IMM GRANULOCYTES # BLD AUTO: 0.01 K/UL (ref 0–0.11)
IMM GRANULOCYTES NFR BLD AUTO: 0.2 % (ref 0–0.9)
LACTATE BLD-SCNC: 1.9 MMOL/L (ref 0.5–2)
LYMPHOCYTES # BLD AUTO: 0.66 K/UL (ref 1–4.8)
LYMPHOCYTES NFR BLD: 12.2 % (ref 22–41)
MCH RBC QN AUTO: 30.8 PG (ref 27–33)
MCHC RBC AUTO-ENTMCNC: 34.5 G/DL (ref 33.7–35.3)
MCV RBC AUTO: 89.2 FL (ref 81.4–97.8)
MONOCYTES # BLD AUTO: 0.37 K/UL (ref 0–0.85)
MONOCYTES NFR BLD AUTO: 6.8 % (ref 0–13.4)
NEUTROPHILS # BLD AUTO: 4.38 K/UL (ref 1.82–7.42)
NEUTROPHILS NFR BLD: 80.6 % (ref 44–72)
NRBC # BLD AUTO: 0 K/UL
NRBC BLD-RTO: 0 /100 WBC
PLATELET # BLD AUTO: 224 K/UL (ref 164–446)
PMV BLD AUTO: 9.6 FL (ref 9–12.9)
POTASSIUM SERPL-SCNC: 3.4 MMOL/L (ref 3.6–5.5)
PROT SERPL-MCNC: 7.9 G/DL (ref 6–8.2)
RBC # BLD AUTO: 4.71 M/UL (ref 4.7–6.1)
SODIUM SERPL-SCNC: 138 MMOL/L (ref 135–145)
WBC # BLD AUTO: 5.4 K/UL (ref 4.8–10.8)

## 2020-01-01 PROCEDURE — 85025 COMPLETE CBC W/AUTO DIFF WBC: CPT

## 2020-01-01 PROCEDURE — A9270 NON-COVERED ITEM OR SERVICE: HCPCS | Performed by: EMERGENCY MEDICINE

## 2020-01-01 PROCEDURE — 87150 DNA/RNA AMPLIFIED PROBE: CPT | Mod: XU

## 2020-01-01 PROCEDURE — 87502 INFLUENZA DNA AMP PROBE: CPT

## 2020-01-01 PROCEDURE — 83605 ASSAY OF LACTIC ACID: CPT

## 2020-01-01 PROCEDURE — 87040 BLOOD CULTURE FOR BACTERIA: CPT | Mod: 91

## 2020-01-01 PROCEDURE — 87077 CULTURE AEROBIC IDENTIFY: CPT

## 2020-01-01 PROCEDURE — 99284 EMERGENCY DEPT VISIT MOD MDM: CPT

## 2020-01-01 PROCEDURE — 700102 HCHG RX REV CODE 250 W/ 637 OVERRIDE(OP): Performed by: EMERGENCY MEDICINE

## 2020-01-01 PROCEDURE — 71045 X-RAY EXAM CHEST 1 VIEW: CPT

## 2020-01-01 PROCEDURE — 80053 COMPREHEN METABOLIC PANEL: CPT

## 2020-01-01 RX ORDER — OSELTAMIVIR PHOSPHATE 30 MG/1
30 CAPSULE ORAL ONCE
Status: COMPLETED | OUTPATIENT
Start: 2020-01-01 | End: 2020-01-01

## 2020-01-01 RX ORDER — SODIUM CHLORIDE, SODIUM LACTATE, POTASSIUM CHLORIDE, AND CALCIUM CHLORIDE .6; .31; .03; .02 G/100ML; G/100ML; G/100ML; G/100ML
30 INJECTION, SOLUTION INTRAVENOUS
Status: DISCONTINUED | OUTPATIENT
Start: 2020-01-01 | End: 2020-01-01 | Stop reason: HOSPADM

## 2020-01-01 RX ORDER — ACETAMINOPHEN 325 MG/1
650 TABLET ORAL ONCE
Status: COMPLETED | OUTPATIENT
Start: 2020-01-01 | End: 2020-01-01

## 2020-01-01 RX ORDER — IBUPROFEN 800 MG/1
800 TABLET ORAL EVERY 8 HOURS PRN
Qty: 30 TAB | Refills: 0 | Status: SHIPPED
Start: 2020-01-01

## 2020-01-01 RX ORDER — OSELTAMIVIR PHOSPHATE 75 MG/1
75 CAPSULE ORAL 2 TIMES DAILY
Qty: 10 CAP | Refills: 0 | Status: SHIPPED
Start: 2020-01-01

## 2020-01-01 RX ADMIN — ACETAMINOPHEN 650 MG: 325 TABLET, FILM COATED ORAL at 14:13

## 2020-01-01 RX ADMIN — OSELTAMIVIR PHOSPHATE 30 MG: 30 CAPSULE ORAL at 14:45

## 2020-01-01 ASSESSMENT — LIFESTYLE VARIABLES
AVERAGE NUMBER OF DAYS PER WEEK YOU HAVE A DRINK CONTAINING ALCOHOL: 0
DO YOU DRINK ALCOHOL: NO
HAVE PEOPLE ANNOYED YOU BY CRITICIZING YOUR DRINKING: NO
ON A TYPICAL DAY WHEN YOU DRINK ALCOHOL HOW MANY DRINKS DO YOU HAVE: 0
EVER HAD A DRINK FIRST THING IN THE MORNING TO STEADY YOUR NERVES TO GET RID OF A HANGOVER: NO
TOTAL SCORE: 0
EVER FELT BAD OR GUILTY ABOUT YOUR DRINKING: NO
TOTAL SCORE: 0
HOW MANY TIMES IN THE PAST YEAR HAVE YOU HAD 5 OR MORE DRINKS IN A DAY: 0
DOES PATIENT WANT TO STOP DRINKING: NO
HAVE YOU EVER FELT YOU SHOULD CUT DOWN ON YOUR DRINKING: NO
CONSUMPTION TOTAL: NEGATIVE
TOTAL SCORE: 0

## 2020-01-01 NOTE — ED PROVIDER NOTES
ED Provider Note    CHIEF COMPLAINT  Chief Complaint   Patient presents with   • Cough   • Generalized Body Aches       HPI  Louis Candelario is a 69 y.o. male here for evaluation of cough, congestion, and generalized body aches.  Patient states that he has been having the symptoms for about 1 day, and is noted that he has no ill contacts.  Does not take anything prior to arrival for the discomfort or fevers at home.  He has no vomiting, no chest pain, no shortness of breath.  He has no abdominal pain, no back pain.  Patient has no other medical concerns at this time.  He denies any neck pain or any strange rashes.  Patient has no pain.  He is eating and drinking as per the usual.  All history is via an  who is his family, and present.      ROS  See HPI for further details, o/w negative.     PAST MEDICAL HISTORY   has a past medical history of Hypertension.    SOCIAL HISTORY  Social History     Tobacco Use   • Smoking status: Never Smoker   • Smokeless tobacco: Never Used   Substance and Sexual Activity   • Alcohol use: Yes     Comment: occasional   • Drug use: No   • Sexual activity: Not on file       Family History  No bleeding disorders    SURGICAL HISTORY   has a past surgical history that includes nasal cauterization and hernia repair.    CURRENT MEDICATIONS  Home Medications     Reviewed by Beatriz Magallon R.N. (Registered Nurse) on 01/01/20 at 1212  Med List Status: Complete   Medication Last Dose Status        Patient Jose Manuel Taking any Medications                       ALLERGIES  No Known Allergies    REVIEW OF SYSTEMS  See HPI for further details. Review of systems as above, otherwise all other systems are negative.     PHYSICAL EXAM  Constitutional: Well developed, well nourished. No acute distress.  HEENT: Normocephalic, atraumatic. Posterior pharynx clear and moist.  Eyes:  EOMI. Normal sclera.  Neck: Supple, Full range of motion, nontender.  Chest/Pulmonary: clear to ausculation.  Symmetrical expansion.   Cardio: Regular rate and rhythm with no murmur.   Abdomen: Soft, nontender. No peritoneal signs. No guarding. No palpable masses.  Musculoskeletal: No deformity, no edema, neurovascular intact.   Neuro: Clear speech, appropriate, cooperative, cranial nerves II-XII grossly intact.  Psych: Normal mood and affect      Results for orders placed or performed during the hospital encounter of 01/01/20   Influenza A/B By PCR (Adult - Flu Only)   Result Value Ref Range    Influenza virus A RNA POSITIVE (A) Negative    Influenza virus B, PCR Negative Negative   CBC WITH DIFFERENTIAL   Result Value Ref Range    WBC 5.4 4.8 - 10.8 K/uL    RBC 4.71 4.70 - 6.10 M/uL    Hemoglobin 14.5 14.0 - 18.0 g/dL    Hematocrit 42.0 42.0 - 52.0 %    MCV 89.2 81.4 - 97.8 fL    MCH 30.8 27.0 - 33.0 pg    MCHC 34.5 33.7 - 35.3 g/dL    RDW 37.7 35.9 - 50.0 fL    Platelet Count 224 164 - 446 K/uL    MPV 9.6 9.0 - 12.9 fL    Neutrophils-Polys 80.60 (H) 44.00 - 72.00 %    Lymphocytes 12.20 (L) 22.00 - 41.00 %    Monocytes 6.80 0.00 - 13.40 %    Eosinophils 0.00 0.00 - 6.90 %    Basophils 0.20 0.00 - 1.80 %    Immature Granulocytes 0.20 0.00 - 0.90 %    Nucleated RBC 0.00 /100 WBC    Neutrophils (Absolute) 4.38 1.82 - 7.42 K/uL    Lymphs (Absolute) 0.66 (L) 1.00 - 4.80 K/uL    Monos (Absolute) 0.37 0.00 - 0.85 K/uL    Eos (Absolute) 0.00 0.00 - 0.51 K/uL    Baso (Absolute) 0.01 0.00 - 0.12 K/uL    Immature Granulocytes (abs) 0.01 0.00 - 0.11 K/uL    NRBC (Absolute) 0.00 K/uL   COMP METABOLIC PANEL   Result Value Ref Range    Sodium 138 135 - 145 mmol/L    Potassium 3.4 (L) 3.6 - 5.5 mmol/L    Chloride 102 96 - 112 mmol/L    Co2 23 20 - 33 mmol/L    Anion Gap 13.0 (H) 0.0 - 11.9    Glucose 112 (H) 65 - 99 mg/dL    Bun 15 8 - 22 mg/dL    Creatinine 1.24 0.50 - 1.40 mg/dL    Calcium 9.2 8.5 - 10.5 mg/dL    AST(SGOT) 18 12 - 45 U/L    ALT(SGPT) 14 2 - 50 U/L    Alkaline Phosphatase 83 30 - 99 U/L    Total Bilirubin 0.5 0.1 - 1.5  mg/dL    Albumin 4.6 3.2 - 4.9 g/dL    Total Protein 7.9 6.0 - 8.2 g/dL    Globulin 3.3 1.9 - 3.5 g/dL    A-G Ratio 1.4 g/dL   LACTIC ACID   Result Value Ref Range    Lactic Acid 1.9 0.5 - 2.0 mmol/L   ESTIMATED GFR   Result Value Ref Range    GFR If African American >60 >60 mL/min/1.73 m 2    GFR If Non African American 58 (A) >60 mL/min/1.73 m 2     DX-CHEST-LIMITED (1 VIEW)   Final Result         No acute cardiopulmonary abnormalities are identified.            PROCEDURES     MEDICAL RECORD  I have reviewed patient's medical record and pertinent results are listed.    COURSE & MEDICAL DECISION MAKING  I have reviewed any medical record information, laboratory studies and radiographic results as noted above.    The nurse taking care of the patient noted that his fever was 103, and felt as though he needed more of a septic work-up.  His lactic is normal, he has no white count, his chest x-ray shows no acute finding, but his influenza is positive.  He will be treated here for influenza, and will follow-up.    HYDRATION: Based on the patient's presentation of Dehydration the patient was given IV fluids. IV Hydration was used because oral hydration was not adequate alone. Upon recheck following hydration, the patient was improved.    2:22 PM  The patient is nontoxic-appearing, and appears comfortable.  He was noted to have a temperature of 39.4 here, has been given Tylenol for the same.  I also start him on Tamiflu, and write a prescription as well.      If you have had any blood pressure issues while here in the emergency department, please see your doctor for a further evaluation or work up.    Differential diagnoses include but not limited to: Influenza, pneumonia, sepsis    This patient presents with influenza.  At this time, I have counseled the patient/family regarding their medications, pain control, and follow up.  They will continue their medications, if any, as prescribed.  They will return immediately for  any worsening symptoms and/or any other medical concerns.  They will see their doctor, or contact the doctor provided, in 1-2 days for follow up.       FINAL IMPRESSION  Influenza A      Electronically signed by: Ollei Rivera, 1/1/2020 2:20 PM

## 2020-01-01 NOTE — ED NOTES
Pt given discharge instructions, all questions answered, lines DC'd, prescriptions  Given and gone over, pt alert,  steady gate, care transferred to patient and family at this time.

## 2020-01-01 NOTE — ED TRIAGE NOTES
Louis Candelario  Chief Complaint   Patient presents with   • Cough   • Generalized Body Aches     Pt ambulatory to triage with above complaint.  Elevated HR noted,  No acute distress.   Pt states was hospitalized with pna in November and hasn't felt like he's getting better and 3 days ago started to feel worse.  Finished home abx as prescribed.   Pt returned to [facility], educated on triage process, and to inform staff of any changes or concerns.

## 2020-01-02 NOTE — ED NOTES
"ED Positive Culture Follow-up/Notification Note:    Date: 1/2/20     Patient seen in the ED on 1/1/2020 for cough, congestion, and generalized body aches x 1 day. The pt denied any vomiting, chest pain, or SOB.      1. Influenza       Discharge Medication List as of 1/1/2020  2:43 PM      START taking these medications    Details   oseltamivir (TAMIFLU) 75 MG Cap Take 1 Cap by mouth 2 times a day., Disp-10 Cap, R-0, Print Plain Paper      ibuprofen (MOTRIN) 800 MG Tab Take 1 Tab by mouth every 8 hours as needed., Disp-30 Tab, R-0, Print Plain Paper           Medications given in the ED:  -Acetaminophen 650mg PO once  -Oseltamivir 30mg PO once    Allergies: Patient has no known allergies.     Vitals:    01/01/20 1205 01/01/20 1209 01/01/20 1247 01/01/20 1458   BP: 110/66  105/57 104/58   Pulse: (!) 118  (!) 101 (!) 105   Resp: 16  20 20   Temp: 37.3 °C (99.1 °F)  (!) 39.4 °C (103 °F)    TempSrc: Temporal  Temporal    SpO2: 92%  94% 93%   Weight:  54.7 kg (120 lb 9.5 oz)     Height: 1.575 m (5' 2\")          Final cultures:   Results     Procedure Component Value Units Date/Time    BLOOD CULTURE [[id number]]  (Abnormal) Collected:  01/01/20 1325    Order Status:  Completed Specimen:  Blood from Peripheral Updated:  01/02/20 1228     Significant Indicator POS     Source BLD     Site PERIPHERAL     Culture Result Growth detected by Bactec instrument. 01/02/2020  12:25  Gram Stain: Gram positive cocci: Possible Staphylococcus sp.  Negative for Staphylococcus aureus and MRSA by PCR. Correlate  ongoing need for antibiotics with clinical condition.  Further report to follow.      Narrative:       CALL  Cain  ER tel. ,  CALLED  ER tel.  01/02/2020, 12:27, RB PERF. RESULTS CALLED TO:[phone]  1 of 2 for Blood Culture x 2 sites order. Per Hospital  Policy: Only change Specimen Src: to \"Line\" if specified by  physician order.  Left Forearm/Arm    BLOOD CULTURE [[id number]] Collected:  01/01/20 1245    Order Status:  Completed " "Specimen:  Blood from Peripheral Updated:  01/02/20 0843     Significant Indicator NEG     Source BLD     Site PERIPHERAL     Culture Result No Growth  Note: Blood cultures are incubated for 5 days and  are monitored continuously.Positive blood cultures  are called to the RN and reported as soon as  they are identified.      Narrative:       2 of 2 blood culture x2  Sites order. Per Hospital Policy:  Only change Specimen Src: to \"Line\" if specified by physician  order.  Right Forearm/Arm    Influenza A/B By PCR (Adult - Flu Only) [[id number]]  (Abnormal) Collected:  01/01/20 1305    Order Status:  Completed Specimen:  Blood from Nasopharyngeal Updated:  01/01/20 1358     Influenza virus A RNA POSITIVE     Influenza virus B, PCR Negative          Plan:   Per conversation with micro lab, pt is growing CoNS in 1 out of 4 blood culture bottles which is most likely a contaminant as pt has no record of invasive devices such as a PICC line. Pt did have a fever in ED, however he tested positive for influenza A.     I attempted to call the pt using  service to inform him of results and see how he was doing, no answer. I left a generic VM asking him to call back ED culture line at [phone].      If the pt calls back and reports that his symptoms have resolved and he is no longer having fevers, the organism is most likely a contaminant and no further follow-up is required. If the pt is still having fevers or signs/symptoms of systemic infection, I recommend counseling him to return to the ED for further assessment.     The pt received the proper renally adjusted dose of oseltamivir in the ED (30mg), however on discharge he received the full adult dose of 75mg BID. His CrCL was 44mL/min, the recommended dose is 30mg BID for CrCL 30-60mL/min. I recommend informing pt of this and offering to call in new Rx below. If the pt would like to continue with the current dose and  denies any symptoms of toxicity (CNS " effects, confusion, hallucinations, delirium, thoughts of self injury), it is reasonable as long as he stays hydrated as he was most likely in [place] when he came in, and his CrCL based on baseline Scr of 0.9 is ~60mL/min.     New renally adjusted prescription to replace oseltamivir 75mg BID:  Oseltamivir 30mg 1 PO BID, #QS to complete 5 day course w/ 0 refills per Dr Skinner protocol      Connor Westbrook, PharmD

## 2020-01-04 LAB
BACTERIA BLD CULT: ABNORMAL
BACTERIA BLD CULT: ABNORMAL
SIGNIFICANT IND 70042: ABNORMAL
SITE SITE: ABNORMAL
SOURCE SOURCE: ABNORMAL

## 2020-01-06 LAB
BACTERIA BLD CULT: NORMAL
SIGNIFICANT IND 70042: NORMAL
SITE SITE: NORMAL
SOURCE SOURCE: NORMAL

## 2021-03-03 DIAGNOSIS — Z23 NEED FOR VACCINATION: ICD-10-CM

## 2021-11-24 ENCOUNTER — NON-PROVIDER VISIT (OUTPATIENT)
Dept: OCCUPATIONAL MEDICINE | Facility: CLINIC | Age: 71
End: 2021-11-24

## 2021-11-24 DIAGNOSIS — Z23 NEED FOR VACCINATION: ICD-10-CM

## 2021-11-24 PROCEDURE — 90746 HEPB VACCINE 3 DOSE ADULT IM: CPT | Performed by: NURSE PRACTITIONER

## 2021-12-22 ENCOUNTER — NON-PROVIDER VISIT (OUTPATIENT)
Dept: OCCUPATIONAL MEDICINE | Facility: CLINIC | Age: 71
End: 2021-12-22

## 2021-12-22 DIAGNOSIS — Z23 NEED FOR VACCINATION: Primary | ICD-10-CM

## 2021-12-22 PROCEDURE — 90746 HEPB VACCINE 3 DOSE ADULT IM: CPT | Performed by: PREVENTIVE MEDICINE

## 2022-08-18 ENCOUNTER — NON-PROVIDER VISIT (OUTPATIENT)
Dept: OCCUPATIONAL MEDICINE | Facility: CLINIC | Age: 72
End: 2022-08-18

## 2022-08-18 DIAGNOSIS — Z23 NEED FOR VACCINATION: ICD-10-CM

## 2022-08-18 PROCEDURE — 90746 HEPB VACCINE 3 DOSE ADULT IM: CPT | Performed by: NURSE PRACTITIONER

## 2023-02-03 ENCOUNTER — NON-PROVIDER VISIT (OUTPATIENT)
Dept: OCCUPATIONAL MEDICINE | Facility: CLINIC | Age: 73
End: 2023-02-03

## 2023-02-03 DIAGNOSIS — Z11.1 ENCOUNTER FOR PPD TEST: Primary | ICD-10-CM

## 2023-02-03 PROCEDURE — 86580 TB INTRADERMAL TEST: CPT | Performed by: NURSE PRACTITIONER

## 2023-02-15 ENCOUNTER — NON-PROVIDER VISIT (OUTPATIENT)
Dept: OCCUPATIONAL MEDICINE | Facility: CLINIC | Age: 73
End: 2023-02-15

## 2023-02-15 DIAGNOSIS — Z11.1 ENCOUNTER FOR PPD TEST: Primary | ICD-10-CM

## 2023-02-15 PROCEDURE — 86580 TB INTRADERMAL TEST: CPT | Performed by: NURSE PRACTITIONER

## 2023-02-17 ENCOUNTER — NON-PROVIDER VISIT (OUTPATIENT)
Dept: OCCUPATIONAL MEDICINE | Facility: CLINIC | Age: 73
End: 2023-02-17

## 2023-02-17 LAB — TB WHEAL 3D P 5 TU DIAM: NORMAL MM

## 2023-11-22 ENCOUNTER — APPOINTMENT (OUTPATIENT)
Dept: RADIOLOGY | Facility: MEDICAL CENTER | Age: 73
End: 2023-11-22
Attending: EMERGENCY MEDICINE
Payer: COMMERCIAL

## 2023-11-22 ENCOUNTER — HOSPITAL ENCOUNTER (EMERGENCY)
Facility: MEDICAL CENTER | Age: 73
End: 2023-11-22
Attending: EMERGENCY MEDICINE
Payer: COMMERCIAL

## 2023-11-22 VITALS
HEART RATE: 75 BPM | WEIGHT: 116.4 LBS | TEMPERATURE: 98 F | HEIGHT: 63 IN | SYSTOLIC BLOOD PRESSURE: 117 MMHG | RESPIRATION RATE: 17 BRPM | OXYGEN SATURATION: 95 % | DIASTOLIC BLOOD PRESSURE: 59 MMHG | BODY MASS INDEX: 20.62 KG/M2

## 2023-11-22 DIAGNOSIS — J18.9 COMMUNITY ACQUIRED PNEUMONIA, BILATERAL: ICD-10-CM

## 2023-11-22 LAB
FLUAV RNA SPEC QL NAA+PROBE: NEGATIVE
FLUBV RNA SPEC QL NAA+PROBE: NEGATIVE
RSV RNA SPEC QL NAA+PROBE: NEGATIVE
SARS-COV-2 RNA RESP QL NAA+PROBE: NOTDETECTED

## 2023-11-22 PROCEDURE — C9803 HOPD COVID-19 SPEC COLLECT: HCPCS

## 2023-11-22 PROCEDURE — 71046 X-RAY EXAM CHEST 2 VIEWS: CPT

## 2023-11-22 PROCEDURE — 700102 HCHG RX REV CODE 250 W/ 637 OVERRIDE(OP): Performed by: EMERGENCY MEDICINE

## 2023-11-22 PROCEDURE — A9270 NON-COVERED ITEM OR SERVICE: HCPCS | Performed by: EMERGENCY MEDICINE

## 2023-11-22 PROCEDURE — 99284 EMERGENCY DEPT VISIT MOD MDM: CPT

## 2023-11-22 PROCEDURE — 0241U HCHG SARS-COV-2 COVID-19 NFCT DS RESP RNA 4 TRGT ED POC: CPT

## 2023-11-22 RX ORDER — AMOXICILLIN AND CLAVULANATE POTASSIUM 875; 125 MG/1; MG/1
1 TABLET, FILM COATED ORAL 2 TIMES DAILY
Qty: 14 TABLET | Refills: 0 | Status: ACTIVE | OUTPATIENT
Start: 2023-11-22 | End: 2023-11-29

## 2023-11-22 RX ORDER — DOXYCYCLINE 100 MG/1
100 CAPSULE ORAL 2 TIMES DAILY
Qty: 14 CAPSULE | Refills: 0 | Status: ACTIVE | OUTPATIENT
Start: 2023-11-22 | End: 2023-11-29

## 2023-11-22 RX ORDER — AMOXICILLIN AND CLAVULANATE POTASSIUM 875; 125 MG/1; MG/1
1 TABLET, FILM COATED ORAL ONCE
Status: COMPLETED | OUTPATIENT
Start: 2023-11-22 | End: 2023-11-22

## 2023-11-22 RX ORDER — AMOXICILLIN AND CLAVULANATE POTASSIUM 875; 125 MG/1; MG/1
1 TABLET, FILM COATED ORAL 2 TIMES DAILY
Qty: 14 TABLET | Refills: 0 | Status: ACTIVE | OUTPATIENT
Start: 2023-11-22 | End: 2023-11-22 | Stop reason: SDUPTHER

## 2023-11-22 RX ORDER — DOXYCYCLINE 100 MG/1
100 CAPSULE ORAL 2 TIMES DAILY
Qty: 14 CAPSULE | Refills: 0 | Status: ACTIVE | OUTPATIENT
Start: 2023-11-22 | End: 2023-11-22 | Stop reason: SDUPTHER

## 2023-11-22 RX ORDER — DOXYCYCLINE 100 MG/1
100 TABLET ORAL ONCE
Status: COMPLETED | OUTPATIENT
Start: 2023-11-22 | End: 2023-11-22

## 2023-11-22 RX ADMIN — AMOXICILLIN AND CLAVULANATE POTASSIUM 1 TABLET: 875; 125 TABLET, FILM COATED ORAL at 18:21

## 2023-11-22 RX ADMIN — DOXYCYCLINE 100 MG: 100 TABLET, FILM COATED ORAL at 18:21

## 2023-11-22 ASSESSMENT — LIFESTYLE VARIABLES: DO YOU DRINK ALCOHOL: NO

## 2023-11-23 NOTE — ED NOTES
Pt brought back to room, agree with triage note. Ambulatory with steady gait.    Covid test was collected. POC now in process.     Family at bedside.

## 2023-11-23 NOTE — ED PROVIDER NOTES
ED Provider Note    CHIEF COMPLAINT  Chief Complaint   Patient presents with    Cough     X 1 week, chest congestion, coughing up phlegm.  Fevers off/on reported at home.         Fever     Off/on x 1 week.      Sore Throat         HPI/ROS  LIMITATION TO HISTORY   Select: Language Urdu,  Used   OUTSIDE HISTORIAN(S):  Family patients daughter states     Louis Candelario is a 73 y.o. male who presents to the emerged part with chief complaint of 1 week of cough chest congestion and a lot of phlegm.  Fevers have been on and off at home for the last week as well.  According the patient's daughter he was doing pretty well has been taking DayQuil and NyQuil he was seen for sore throat and prescribed Tessalon Perles.  She states today he was at a gas station and called her crying because he was coughing so much phlegm up and feeling short of breath that he just did not feel well and it made him and scared.  He tells me he did not have any chest pain but he has been coughing up more more phlegm and feeling short of breath when he does anything exertional.  He denies difficulty sleeping at night or difficulty sleeping at rest.  Denies any chest pain denies any hemoptysis does not smoke cigarettes    PAST MEDICAL HISTORY   has a past medical history of Hypertension.    SURGICAL HISTORY   has a past surgical history that includes nasal cauterization and hernia repair.    FAMILY HISTORY  History reviewed. No pertinent family history.    SOCIAL HISTORY  Social History     Tobacco Use    Smoking status: Never    Smokeless tobacco: Never   Vaping Use    Vaping Use: Never used   Substance and Sexual Activity    Alcohol use: Yes     Comment: Occasional    Drug use: No    Sexual activity: Not on file       CURRENT MEDICATIONS  Home Medications       Reviewed by Swati Mcknight R.N. (Registered Nurse) on 11/22/23 at 1606  Med List Status: Not Addressed     Medication Last Dose Status   ibuprofen (MOTRIN) 800 MG Tab  " Active   oseltamivir (TAMIFLU) 75 MG Cap  Active                    ALLERGIES  No Known Allergies    PHYSICAL EXAM  VITAL SIGNS: /68   Pulse 85   Temp 36.7 °C (98.1 °F) (Temporal)   Resp 18   Ht 1.6 m (5' 3\")   Wt 52.8 kg (116 lb 6.5 oz)   SpO2 96%   BMI 20.62 kg/m²    Pulse OX: Pulse Oxygen level is normal  Constitutional: Alert in no apparent distress.  HENT: Normocephalic, Atraumatic, MMM mild nasal congestion oropharynx clear uvula midline no tonsillar exudate  Eyes: PERound. Conjunctiva normal, non-icteric.   Heart: Regular rate and rhythm, intact distal pulses   Lungs: Symmetrical movement, no resp distress rhonchi bilateral lower lobes left greater than right  Abdomen: Non-tender, non-distended, normal bowel sounds  Skin: Warm, Dry, No erythema, No rash.   Neurologic: Alert and oriented, Grossly non-focal.       DIAGNOSTIC STUDIES / PROCEDURES      LABS  Labs Reviewed   POCT COV-2, FLU A/B, RSV BY PCR   POC COV-2, FLU A/B, RSV BY PCR         RADIOLOGY  I have independently interpreted the diagnostic imaging associated with this visit and am waiting the final reading from the radiologist.   My preliminary interpretation is as follows:     Chest x-ray 2 views compared to prior imaging appears to have bilateral lower lobe pneumonia left greater than right    Radiologist interpretation:     DX-CHEST-2 VIEWS   Final Result      1.  Hypoinflation with bibasilar atelectasis.   2.  No lobar pneumonia or pneumothorax.            COURSE & MEDICAL DECISION MAKING    ED Observation Status? Yes; I am placing the patient in to an observation status due to a diagnostic uncertainty as well as therapeutic intensity. Patient placed in observation status at 5:04 PM, 11/22/2023.     Observation plan is as follows: cxr, covid flu rsv    Upon Reevaluation, the patient's condition has: Improved; and will be discharged.    Patient discharged from ED Observation status at 5:56 PM  (Time) 11/22/23  (Date).     INITIAL " ASSESSMENT, COURSE AND PLAN  Care Narrative:     Patient is a 73-year-old male presents to the emergency department with cough and congestion over the last week he is got abnormal breath sounds on examination system with rhonchi at the bases no wheezing no tachypnea no accessory muscle use at this time.  He does not dehydrated he denies any chest pain.  He is comfortable at rest.  Currently oxygen saturations within normal limits.  COVID flu and RSV was ordered by triage we will perform a chest x-ray as well.        DISPOSITION AND DISCUSSIONS  5:53 PM  My evaluation of the chest x-ray and physical examination and history is consistent with bilateral lower lobe pneumonia.  Fortunately he is not hypoxic.  We will be starting him on oral antibiotics for his pneumonia strict return precautions over the next few days for any new or worsening difficulty breathing and kind of generalized rest at home with continued medications for cough and cold symptoms.  Patient and his daughter understand feel comfortable going home      I have discussed management of the patient with the following physicians and NILSA's:  none    Discussion of management with other [facility] or appropriate source(s): None     Escalation of care considered, and ultimately not performed:acute inpatient care management, however at this time, the patient is most appropriate for outpatient management    Barriers to care at this time, including but not limited to: Patient does not have established PCP.     Decision tools and prescription drugs considered including, but not limited to: Antibiotics were prescribed  .      The patient will return for new or worsening symptoms and is stable at the time of discharge.    The patient is referred to a primary physician for blood pressure management, diabetic screening, and for all other preventative health concerns.    DISPOSITION:  Patient will be discharged home in stable condition.    FOLLOW UP:  [facility], Emergency Dept  [address] [facility] [id number] [phone]  In 2 days  If symptoms worsen      OUTPATIENT MEDICATIONS:  Discharge Medication List as of 11/22/2023  6:27 PM        START taking these medications    Details   amoxicillin-clavulanate (AUGMENTIN) 875-125 MG Tab Take 1 Tablet by mouth 2 times a day for 7 days., Disp-14 Tablet, R-0, Normal      doxycycline (MONODOX) 100 MG capsule Take 1 Capsule by mouth 2 times a day for 7 days., Disp-14 Capsule, R-0, Normal             FINAL DIAGNOSIS  1. Community acquired pneumonia, bilateral           Electronically signed by: Swetha Stone M.D., 11/22/2023 5:04 PM

## 2023-11-23 NOTE — ED NOTES
Pt discharged to home. Pt was given follow up instructions and prescriptions for augmentin and doxycycline. Pt verbalized understanding of all instructions for discharge and is ambulatory out of ED with steady gait. AOx4

## 2023-11-23 NOTE — ED TRIAGE NOTES
Chief Complaint   Patient presents with    Cough     X 1 week, chest congestion, coughing up phlegm.  Fevers off/on reported at home.         Fever     Off/on x 1 week.      Sore Throat     Ambulatory into triage for above.  Pt. Denies any chest pain or SOB.  Congested cough, throat clearing noted in triage.

## 2024-03-04 NOTE — DISCHARGE INSTRUCTIONS
Reason for Disposition   [1] Caller has nonurgent question about med that PCP or specialist prescribed AND [2] triager unable to answer question    Additional Information   Negative: Diabetes medication overdose (e.g., insulin)   Negative: Drug overdose and nurse unable to answer question   Negative: [1] Breastfeeding AND [2] question about maternal medicines   Negative: Medication refusal OR child uncooperative when trying to give medication   Negative: Medication administration techniques in cooperative child   Negative: Vomiting or nausea due to medication OR medication re-dosing questions after vomiting medicine   Negative: Diarrhea from taking antibiotic   Negative: Caller requesting a prescription for Strep throat and has a positive culture result   Negative: Rash began while taking amoxicillin OR augmentin   Negative: Rash while taking a prescription medication or within 3 days of stopping it   Negative: Immunization reaction suspected   Negative: Asthma rescue med (e.g., albuterol) or devices request   Negative: [1] Asthma AND [2] having symptoms of asthma (cough, wheezing, etc)   Negative: [1] Croup symptoms AND [2] requests oral steroid OR has steroid and wants to start it   Negative: [1] Influenza symptoms AND [2] anti-viral med (such as Tamiflu) prescription request   Negative: [1] Eczema flare-up AND [2] steroid ointment refill request   Negative: [1] Symptom of illness (e.g., headache, abdominal pain, earache, vomiting) AND [2] more than mild   Negative: Reflux med questions and increased crying   Negative: Reflux med questions and no increased crying   Negative: Post-op pain or meds, questions about   Negative: Birth control pills, questions about   Negative: Caller requesting information not related to medication   Negative: [1] Using any prescription or OTC medication AND [2] caller has questions about side effects or safety   Negative: [1] Using complementary or alternative medicine (CAM) AND [2]  Take antibiotic as prescribed.  Return for worsening cough, worsening shortness of breath, continued fever, or other concerns.   "caller has questions about side effects or safety   Negative: [1] Prescription not at pharmacy AND [2] was prescribed by PCP recently (Exception: RN has access to EMR and prescription is recorded there. Go to Home Care and confirm for pharmacy.)   Negative: [1] Prescription refill request for essential med (harm to patient if med not taken) AND [2] triager unable to fill per unit policy   Negative: Pharmacy calling with prescription question and triager unable to answer question   Negative: [1] Caller has urgent question about med that PCP or specialist prescribed AND [2] triager unable to answer question   Negative: [1] Prescription request for spilled antibiotic AND [2] triager unable to fill per unit policy (Exception: 3 or less days remaining in a prescribed 10 day course and child improved)   Negative: [1] Prescription request for spilled essential medication (e.g., steroids, seizure medicines) AND [2] triager unable to fill per unit policy   Negative: [1] Caller has medication question about med not prescribed by PCP AND [2] triager unable to answer question (e.g. compatibility with other med, storage, dosing)   Negative: Prescription request for new medication (not a refill)   Negative: Prescription refill request for a controlled substance (such as most ADHD meds, opioids, benzodiazepines like Ativan [lorazepam] or Xanax [alprazolam])   Negative: [1] Prescription refill request for non-essential med (no harm to patient if med not taken) AND [2] triager unable to fill per unit policy    Answer Assessment - Initial Assessment Questions  1. NAME of MEDICATION: \"What medicine are you calling about?\" \"Why is your child on this medication?\"      Brompheniramine    2.  QUESTION: \"What is your question?      Can tylenol be given when taking  3.  PRESCRIBING HCP: \"Who prescribed it?\" Reason: if prescribed by specialist, call should be referred to that group.      Dr. Henriquez  4.  SYMPTOMS: \"Does your child have any " "symptoms?\"      Temp (although just felt hot, does not have therm)  5.  SEVERITY: If symptoms are present, ask, \"Are they mild, moderate or severe?\"  (Caution: Triage is required if symptoms are more than mild)      mild    Protocols used: Medication Question Call-PEDIATRIC-    "

## 2024-06-12 ENCOUNTER — APPOINTMENT (OUTPATIENT)
Dept: RADIOLOGY | Facility: MEDICAL CENTER | Age: 74
DRG: 871 | End: 2024-06-12
Attending: EMERGENCY MEDICINE
Payer: COMMERCIAL

## 2024-06-12 ENCOUNTER — HOSPITAL ENCOUNTER (INPATIENT)
Facility: MEDICAL CENTER | Age: 74
LOS: 1 days | DRG: 871 | End: 2024-06-13
Attending: EMERGENCY MEDICINE | Admitting: HOSPITALIST
Payer: COMMERCIAL

## 2024-06-12 DIAGNOSIS — A41.9 SEPSIS, DUE TO UNSPECIFIED ORGANISM, UNSPECIFIED WHETHER ACUTE ORGAN DYSFUNCTION PRESENT (HCC): ICD-10-CM

## 2024-06-12 DIAGNOSIS — J18.9 PNEUMONIA OF LEFT LUNG DUE TO INFECTIOUS ORGANISM, UNSPECIFIED PART OF LUNG: ICD-10-CM

## 2024-06-12 PROBLEM — R07.81 PLEURITIC CHEST PAIN: Status: ACTIVE | Noted: 2024-06-12

## 2024-06-12 PROBLEM — G25.0 ESSENTIAL TREMOR: Status: ACTIVE | Noted: 2024-06-12

## 2024-06-12 PROBLEM — I95.9 HYPOTENSION: Status: ACTIVE | Noted: 2024-06-12

## 2024-06-12 LAB
ALBUMIN SERPL BCP-MCNC: 4.4 G/DL (ref 3.2–4.9)
ALBUMIN/GLOB SERPL: 1.8 G/DL
ALP SERPL-CCNC: 92 U/L (ref 30–99)
ALT SERPL-CCNC: 11 U/L (ref 2–50)
ANION GAP SERPL CALC-SCNC: 16 MMOL/L (ref 7–16)
APPEARANCE UR: CLEAR
AST SERPL-CCNC: 14 U/L (ref 12–45)
BASOPHILS # BLD AUTO: 0.1 % (ref 0–1.8)
BASOPHILS # BLD: 0.02 K/UL (ref 0–0.12)
BILIRUB SERPL-MCNC: 0.6 MG/DL (ref 0.1–1.5)
BILIRUB UR QL STRIP.AUTO: NEGATIVE
BUN SERPL-MCNC: 19 MG/DL (ref 8–22)
CALCIUM ALBUM COR SERPL-MCNC: 8.8 MG/DL (ref 8.5–10.5)
CALCIUM SERPL-MCNC: 9.1 MG/DL (ref 8.5–10.5)
CHLORIDE SERPL-SCNC: 103 MMOL/L (ref 96–112)
CO2 SERPL-SCNC: 17 MMOL/L (ref 20–33)
COLOR UR: YELLOW
CREAT SERPL-MCNC: 1.12 MG/DL (ref 0.5–1.4)
EKG IMPRESSION: NORMAL
EOSINOPHIL # BLD AUTO: 0 K/UL (ref 0–0.51)
EOSINOPHIL NFR BLD: 0 % (ref 0–6.9)
ERYTHROCYTE [DISTWIDTH] IN BLOOD BY AUTOMATED COUNT: 36.2 FL (ref 35.9–50)
FLUAV RNA SPEC QL NAA+PROBE: NEGATIVE
FLUBV RNA SPEC QL NAA+PROBE: NEGATIVE
GFR SERPLBLD CREATININE-BSD FMLA CKD-EPI: 69 ML/MIN/1.73 M 2
GLOBULIN SER CALC-MCNC: 2.5 G/DL (ref 1.9–3.5)
GLUCOSE SERPL-MCNC: 130 MG/DL (ref 65–99)
GLUCOSE UR STRIP.AUTO-MCNC: NEGATIVE MG/DL
HCT VFR BLD AUTO: 40.9 % (ref 42–52)
HGB BLD-MCNC: 14.9 G/DL (ref 14–18)
IMM GRANULOCYTES # BLD AUTO: 0.07 K/UL (ref 0–0.11)
IMM GRANULOCYTES NFR BLD AUTO: 0.4 % (ref 0–0.9)
KETONES UR STRIP.AUTO-MCNC: ABNORMAL MG/DL
LACTATE SERPL-SCNC: 0.9 MMOL/L (ref 0.5–2)
LEUKOCYTE ESTERASE UR QL STRIP.AUTO: NEGATIVE
LYMPHOCYTES # BLD AUTO: 0.54 K/UL (ref 1–4.8)
LYMPHOCYTES NFR BLD: 3.2 % (ref 22–41)
MCH RBC QN AUTO: 31.1 PG (ref 27–33)
MCHC RBC AUTO-ENTMCNC: 36.4 G/DL (ref 32.3–36.5)
MCV RBC AUTO: 85.4 FL (ref 81.4–97.8)
MICRO URNS: ABNORMAL
MONOCYTES # BLD AUTO: 0.89 K/UL (ref 0–0.85)
MONOCYTES NFR BLD AUTO: 5.3 % (ref 0–13.4)
NEUTROPHILS # BLD AUTO: 15.22 K/UL (ref 1.82–7.42)
NEUTROPHILS NFR BLD: 91 % (ref 44–72)
NITRITE UR QL STRIP.AUTO: NEGATIVE
NRBC # BLD AUTO: 0 K/UL
NRBC BLD-RTO: 0 /100 WBC (ref 0–0.2)
PH UR STRIP.AUTO: 5.5 [PH] (ref 5–8)
PLATELET # BLD AUTO: 245 K/UL (ref 164–446)
PMV BLD AUTO: 9.5 FL (ref 9–12.9)
POTASSIUM SERPL-SCNC: 3.7 MMOL/L (ref 3.6–5.5)
PROCALCITONIN SERPL-MCNC: 0.12 NG/ML
PROT SERPL-MCNC: 6.9 G/DL (ref 6–8.2)
PROT UR QL STRIP: NEGATIVE MG/DL
RBC # BLD AUTO: 4.79 M/UL (ref 4.7–6.1)
RBC UR QL AUTO: NEGATIVE
RSV RNA SPEC QL NAA+PROBE: NEGATIVE
SARS-COV-2 RNA RESP QL NAA+PROBE: NOTDETECTED
SODIUM SERPL-SCNC: 136 MMOL/L (ref 135–145)
SP GR UR STRIP.AUTO: >=1.045
TROPONIN T SERPL-MCNC: 8 NG/L (ref 6–19)
TROPONIN T SERPL-MCNC: 9 NG/L (ref 6–19)
UROBILINOGEN UR STRIP.AUTO-MCNC: 1 MG/DL
WBC # BLD AUTO: 16.7 K/UL (ref 4.8–10.8)

## 2024-06-12 PROCEDURE — 83605 ASSAY OF LACTIC ACID: CPT

## 2024-06-12 PROCEDURE — 87040 BLOOD CULTURE FOR BACTERIA: CPT | Mod: 91

## 2024-06-12 PROCEDURE — 700102 HCHG RX REV CODE 250 W/ 637 OVERRIDE(OP): Performed by: HOSPITALIST

## 2024-06-12 PROCEDURE — 84484 ASSAY OF TROPONIN QUANT: CPT

## 2024-06-12 PROCEDURE — 770020 HCHG ROOM/CARE - TELE (206)

## 2024-06-12 PROCEDURE — 96365 THER/PROPH/DIAG IV INF INIT: CPT

## 2024-06-12 PROCEDURE — 0241U HCHG SARS-COV-2 COVID-19 NFCT DS RESP RNA 4 TRGT ED POC: CPT

## 2024-06-12 PROCEDURE — 36415 COLL VENOUS BLD VENIPUNCTURE: CPT

## 2024-06-12 PROCEDURE — 700111 HCHG RX REV CODE 636 W/ 250 OVERRIDE (IP): Mod: JZ | Performed by: HOSPITALIST

## 2024-06-12 PROCEDURE — 71045 X-RAY EXAM CHEST 1 VIEW: CPT

## 2024-06-12 PROCEDURE — 96375 TX/PRO/DX INJ NEW DRUG ADDON: CPT

## 2024-06-12 PROCEDURE — A9270 NON-COVERED ITEM OR SERVICE: HCPCS | Mod: UD | Performed by: EMERGENCY MEDICINE

## 2024-06-12 PROCEDURE — 71275 CT ANGIOGRAPHY CHEST: CPT

## 2024-06-12 PROCEDURE — 80053 COMPREHEN METABOLIC PANEL: CPT

## 2024-06-12 PROCEDURE — 93005 ELECTROCARDIOGRAM TRACING: CPT | Performed by: EMERGENCY MEDICINE

## 2024-06-12 PROCEDURE — A9270 NON-COVERED ITEM OR SERVICE: HCPCS | Performed by: HOSPITALIST

## 2024-06-12 PROCEDURE — 700111 HCHG RX REV CODE 636 W/ 250 OVERRIDE (IP): Mod: JZ,UD | Performed by: EMERGENCY MEDICINE

## 2024-06-12 PROCEDURE — 700102 HCHG RX REV CODE 250 W/ 637 OVERRIDE(OP): Mod: UD | Performed by: EMERGENCY MEDICINE

## 2024-06-12 PROCEDURE — 93005 ELECTROCARDIOGRAM TRACING: CPT

## 2024-06-12 PROCEDURE — 81003 URINALYSIS AUTO W/O SCOPE: CPT

## 2024-06-12 PROCEDURE — 99223 1ST HOSP IP/OBS HIGH 75: CPT | Mod: AI | Performed by: HOSPITALIST

## 2024-06-12 PROCEDURE — 85025 COMPLETE CBC W/AUTO DIFF WBC: CPT

## 2024-06-12 PROCEDURE — 87086 URINE CULTURE/COLONY COUNT: CPT

## 2024-06-12 PROCEDURE — 99285 EMERGENCY DEPT VISIT HI MDM: CPT

## 2024-06-12 PROCEDURE — 84145 PROCALCITONIN (PCT): CPT

## 2024-06-12 PROCEDURE — 700105 HCHG RX REV CODE 258: Performed by: HOSPITALIST

## 2024-06-12 PROCEDURE — 700105 HCHG RX REV CODE 258: Mod: UD | Performed by: EMERGENCY MEDICINE

## 2024-06-12 PROCEDURE — 700117 HCHG RX CONTRAST REV CODE 255: Performed by: EMERGENCY MEDICINE

## 2024-06-12 RX ORDER — ACETAMINOPHEN 325 MG/1
650 TABLET ORAL EVERY 6 HOURS PRN
Status: DISCONTINUED | OUTPATIENT
Start: 2024-06-12 | End: 2024-06-13 | Stop reason: HOSPADM

## 2024-06-12 RX ORDER — SODIUM CHLORIDE, SODIUM LACTATE, POTASSIUM CHLORIDE, AND CALCIUM CHLORIDE .6; .31; .03; .02 G/100ML; G/100ML; G/100ML; G/100ML
30 INJECTION, SOLUTION INTRAVENOUS ONCE
Status: COMPLETED | OUTPATIENT
Start: 2024-06-12 | End: 2024-06-12

## 2024-06-12 RX ORDER — SODIUM CHLORIDE, SODIUM LACTATE, POTASSIUM CHLORIDE, AND CALCIUM CHLORIDE .6; .31; .03; .02 G/100ML; G/100ML; G/100ML; G/100ML
500 INJECTION, SOLUTION INTRAVENOUS
Status: DISCONTINUED | OUTPATIENT
Start: 2024-06-12 | End: 2024-06-13 | Stop reason: HOSPADM

## 2024-06-12 RX ORDER — ONDANSETRON 4 MG/1
4 TABLET, ORALLY DISINTEGRATING ORAL EVERY 4 HOURS PRN
Status: DISCONTINUED | OUTPATIENT
Start: 2024-06-12 | End: 2024-06-13 | Stop reason: HOSPADM

## 2024-06-12 RX ORDER — AZITHROMYCIN 250 MG/1
500 TABLET, FILM COATED ORAL EVERY EVENING
Status: DISCONTINUED | OUTPATIENT
Start: 2024-06-13 | End: 2024-06-13 | Stop reason: HOSPADM

## 2024-06-12 RX ORDER — ONDANSETRON 2 MG/ML
4 INJECTION INTRAMUSCULAR; INTRAVENOUS EVERY 4 HOURS PRN
Status: DISCONTINUED | OUTPATIENT
Start: 2024-06-12 | End: 2024-06-13 | Stop reason: HOSPADM

## 2024-06-12 RX ORDER — ENOXAPARIN SODIUM 100 MG/ML
40 INJECTION SUBCUTANEOUS DAILY
Status: DISCONTINUED | OUTPATIENT
Start: 2024-06-12 | End: 2024-06-13 | Stop reason: HOSPADM

## 2024-06-12 RX ORDER — TOPIRAMATE 25 MG/1
25 TABLET ORAL DAILY
COMMUNITY
Start: 2024-05-28

## 2024-06-12 RX ORDER — SODIUM CHLORIDE, SODIUM LACTATE, POTASSIUM CHLORIDE, AND CALCIUM CHLORIDE .6; .31; .03; .02 G/100ML; G/100ML; G/100ML; G/100ML
30 INJECTION, SOLUTION INTRAVENOUS ONCE
Status: ACTIVE | OUTPATIENT
Start: 2024-06-12 | End: 2024-06-13

## 2024-06-12 RX ORDER — TOPIRAMATE 25 MG/1
25 TABLET ORAL DAILY
Status: DISCONTINUED | OUTPATIENT
Start: 2024-06-13 | End: 2024-06-13 | Stop reason: HOSPADM

## 2024-06-12 RX ORDER — LISINOPRIL AND HYDROCHLOROTHIAZIDE 25; 20 MG/1; MG/1
0.5 TABLET ORAL DAILY
Status: ON HOLD | COMMUNITY
End: 2024-06-13

## 2024-06-12 RX ORDER — AZITHROMYCIN 250 MG/1
500 TABLET, FILM COATED ORAL ONCE
Status: COMPLETED | OUTPATIENT
Start: 2024-06-12 | End: 2024-06-12

## 2024-06-12 RX ORDER — KETOROLAC TROMETHAMINE 15 MG/ML
15 INJECTION, SOLUTION INTRAMUSCULAR; INTRAVENOUS ONCE
Status: COMPLETED | OUTPATIENT
Start: 2024-06-12 | End: 2024-06-12

## 2024-06-12 RX ORDER — SODIUM CHLORIDE, SODIUM LACTATE, POTASSIUM CHLORIDE, CALCIUM CHLORIDE 600; 310; 30; 20 MG/100ML; MG/100ML; MG/100ML; MG/100ML
1000 INJECTION, SOLUTION INTRAVENOUS ONCE
Status: COMPLETED | OUTPATIENT
Start: 2024-06-12 | End: 2024-06-12

## 2024-06-12 RX ADMIN — AMPICILLIN AND SULBACTAM 3 G: 1; 2 INJECTION, POWDER, FOR SOLUTION INTRAMUSCULAR; INTRAVENOUS at 21:54

## 2024-06-12 RX ADMIN — AMPICILLIN AND SULBACTAM 3 G: 1; 2 INJECTION, POWDER, FOR SOLUTION INTRAMUSCULAR; INTRAVENOUS at 15:40

## 2024-06-12 RX ADMIN — SODIUM CHLORIDE, POTASSIUM CHLORIDE, SODIUM LACTATE AND CALCIUM CHLORIDE 1000 ML: 600; 310; 30; 20 INJECTION, SOLUTION INTRAVENOUS at 10:02

## 2024-06-12 RX ADMIN — ACETAMINOPHEN 650 MG: 325 TABLET, FILM COATED ORAL at 14:53

## 2024-06-12 RX ADMIN — ENOXAPARIN SODIUM 40 MG: 100 INJECTION SUBCUTANEOUS at 21:50

## 2024-06-12 RX ADMIN — SODIUM CHLORIDE, POTASSIUM CHLORIDE, SODIUM LACTATE AND CALCIUM CHLORIDE 1593 ML: 600; 310; 30; 20 INJECTION, SOLUTION INTRAVENOUS at 11:30

## 2024-06-12 RX ADMIN — IOHEXOL 55 ML: 350 INJECTION, SOLUTION INTRAVENOUS at 10:35

## 2024-06-12 RX ADMIN — KETOROLAC TROMETHAMINE 15 MG: 15 INJECTION, SOLUTION INTRAMUSCULAR; INTRAVENOUS at 10:03

## 2024-06-12 RX ADMIN — AMPICILLIN AND SULBACTAM 3 G: 1; 2 INJECTION, POWDER, FOR SOLUTION INTRAMUSCULAR; INTRAVENOUS at 10:43

## 2024-06-12 RX ADMIN — AZITHROMYCIN DIHYDRATE 500 MG: 250 TABLET, FILM COATED ORAL at 10:43

## 2024-06-12 SDOH — ECONOMIC STABILITY: TRANSPORTATION INSECURITY
IN THE PAST 12 MONTHS, HAS LACK OF RELIABLE TRANSPORTATION KEPT YOU FROM MEDICAL APPOINTMENTS, MEETINGS, WORK OR FROM GETTING THINGS NEEDED FOR DAILY LIVING?: NO

## 2024-06-12 SDOH — ECONOMIC STABILITY: TRANSPORTATION INSECURITY
IN THE PAST 12 MONTHS, HAS THE LACK OF TRANSPORTATION KEPT YOU FROM MEDICAL APPOINTMENTS OR FROM GETTING MEDICATIONS?: NO

## 2024-06-12 ASSESSMENT — LIFESTYLE VARIABLES
EVER HAD A DRINK FIRST THING IN THE MORNING TO STEADY YOUR NERVES TO GET RID OF A HANGOVER: NO
EVER FELT BAD OR GUILTY ABOUT YOUR DRINKING: NO
HAVE YOU EVER FELT YOU SHOULD CUT DOWN ON YOUR DRINKING: NO
CONSUMPTION TOTAL: NEGATIVE
TOTAL SCORE: 0
ALCOHOL_USE: YES
TOTAL SCORE: 0
AVERAGE NUMBER OF DAYS PER WEEK YOU HAVE A DRINK CONTAINING ALCOHOL: 1
HOW MANY TIMES IN THE PAST YEAR HAVE YOU HAD 5 OR MORE DRINKS IN A DAY: 0
ON A TYPICAL DAY WHEN YOU DRINK ALCOHOL HOW MANY DRINKS DO YOU HAVE: 1
HAVE PEOPLE ANNOYED YOU BY CRITICIZING YOUR DRINKING: NO
TOTAL SCORE: 0

## 2024-06-12 ASSESSMENT — COGNITIVE AND FUNCTIONAL STATUS - GENERAL
SUGGESTED CMS G CODE MODIFIER DAILY ACTIVITY: CJ
WALKING IN HOSPITAL ROOM: A LITTLE
TURNING FROM BACK TO SIDE WHILE IN FLAT BAD: A LITTLE
MOBILITY SCORE: 19
CLIMB 3 TO 5 STEPS WITH RAILING: A LITTLE
SUGGESTED CMS G CODE MODIFIER MOBILITY: CK
MOVING FROM LYING ON BACK TO SITTING ON SIDE OF FLAT BED: A LITTLE
HELP NEEDED FOR BATHING: A LITTLE
DRESSING REGULAR LOWER BODY CLOTHING: A LITTLE
MOVING TO AND FROM BED TO CHAIR: A LITTLE
TOILETING: A LITTLE
DAILY ACTIVITIY SCORE: 21

## 2024-06-12 ASSESSMENT — PATIENT HEALTH QUESTIONNAIRE - PHQ9
SUM OF ALL RESPONSES TO PHQ9 QUESTIONS 1 AND 2: 0
2. FEELING DOWN, DEPRESSED, IRRITABLE, OR HOPELESS: NOT AT ALL
1. LITTLE INTEREST OR PLEASURE IN DOING THINGS: NOT AT ALL

## 2024-06-12 ASSESSMENT — SOCIAL DETERMINANTS OF HEALTH (SDOH)
WITHIN THE PAST 12 MONTHS, YOU WORRIED THAT YOUR FOOD WOULD RUN OUT BEFORE YOU GOT THE MONEY TO BUY MORE: NEVER TRUE
WITHIN THE PAST 12 MONTHS, THE FOOD YOU BOUGHT JUST DIDN'T LAST AND YOU DIDN'T HAVE MONEY TO GET MORE: NEVER TRUE
WITHIN THE LAST YEAR, HAVE YOU BEEN HUMILIATED OR EMOTIONALLY ABUSED IN OTHER WAYS BY YOUR PARTNER OR EX-PARTNER?: NO
IN THE PAST 12 MONTHS, HAS THE ELECTRIC, GAS, OIL, OR WATER COMPANY THREATENED TO SHUT OFF SERVICE IN YOUR HOME?: NO
WITHIN THE LAST YEAR, HAVE TO BEEN RAPED OR FORCED TO HAVE ANY KIND OF SEXUAL ACTIVITY BY YOUR PARTNER OR EX-PARTNER?: NO
WITHIN THE LAST YEAR, HAVE YOU BEEN KICKED, HIT, SLAPPED, OR OTHERWISE PHYSICALLY HURT BY YOUR PARTNER OR EX-PARTNER?: NO
WITHIN THE LAST YEAR, HAVE YOU BEEN AFRAID OF YOUR PARTNER OR EX-PARTNER?: NO

## 2024-06-12 ASSESSMENT — PAIN DESCRIPTION - PAIN TYPE
TYPE: ACUTE PAIN
TYPE: ACUTE PAIN

## 2024-06-12 ASSESSMENT — FIBROSIS 4 INDEX: FIB4 SCORE: 1.24

## 2024-06-12 ASSESSMENT — COPD QUESTIONNAIRES
DO YOU EVER COUGH UP ANY MUCUS OR PHLEGM?: NO/ONLY WITH OCCASIONAL COLDS OR INFECTIONS
HAVE YOU SMOKED AT LEAST 100 CIGARETTES IN YOUR ENTIRE LIFE: NO/DON'T KNOW
DURING THE PAST 4 WEEKS HOW MUCH DID YOU FEEL SHORT OF BREATH: MOST  OR ALL OF THE TIME
COPD SCREENING SCORE: 4

## 2024-06-12 NOTE — ASSESSMENT & PLAN NOTE
This is Sepsis Present on admission  SIRS criteria identified on my evaluation include: Tachypnea, with respirations greater than 20 per minute and Leukocytosis, with WBC greater than 12,000  Clinical indicators of end organ dysfunction include Hypotension with systolic blood pressure less than 90 or MAP less than 65  Source is pneumonia  Sepsis protocol initiated  Crystalloid Fluid Administration: Fluid resuscitation ordered per standard protocol - 30 mL/kg per current or ideal body weight  IV antibiotics as appropriate for source of sepsis  Reassessment: I have reassessed the patient's hemodynamic status

## 2024-06-12 NOTE — H&P
Hospital Medicine History & Physical Note    Date of Service  6/12/2024    Primary Care Physician  Pcp Pt States None    Consultants      Code Status  Full Code    Chief Complaint  Chief Complaint   Patient presents with    Chest Pain     L of chest starting last night, constant, non-radiating, described as 'stabbing', denies cardiac hx    Shortness of Breath     Starting last night, no respiratory distress while speaking full sentences, denies respiratory hx, denies recent cough    Pain     Generalized body 'pain', states it feels different than typical body aches associated with illness        History of Presenting Illness  Louis Candelario is a 73 y.o. male who presented 6/12/2024 with history of hypertension essential tremor presented to the emergency department for evaluation of dyspnea and pleuritic chest pain.  Patient was having generalized muscle aches about a week ago over the past couple of days he was started to feel short of breath and then developed cough associated with a pleuritic chest pain.  He presented to the emergency department for further evaluation.  No recent travel or sick contacts.He has been compliant with his medical therapy.  He has lost about 11 pounds unintentional over the past year he has followed up with his PCP and has been referred for screening colonoscopy.  Patient was hypotensive with systolic blood pressure in the 80s improved after IV fluid resuscitation    I discussed the plan of care with patient, family, and ED physician .    Review of Systems  Review of Systems   All other systems reviewed and are negative.      Past Medical History   has a past medical history of Hypertension.  Essential tremor    Surgical History   has a past surgical history that includes nasal cauterization and hernia repair.     Family History    Family history reviewed with patient. There is no family history that is pertinent to the chief complaint.     Social History   reports that he has  never smoked. He has never used smokeless tobacco. He reports current alcohol use. He reports that he does not use drugs.    Allergies  No Known Allergies    Medications     Lisinopril HCT, Topamax   Facility-Administered Medications: None       Physical Exam  Temp:  [37.5 °C (99.5 °F)] 37.5 °C (99.5 °F)  Pulse:  [80-95] 80  Resp:  [18-24] 24  BP: ()/(53-59) 92/53  SpO2:  [94 %-95 %] 95 %  Blood Pressure : 92/53   Temperature: 37.5 °C (99.5 °F)   Pulse: 80   Respiration: (!) 24   Pulse Oximetry: 95 %       Physical Exam  Vitals and nursing note reviewed.   Constitutional:       Appearance: He is well-developed. He is not diaphoretic.   HENT:      Head: Normocephalic and atraumatic.      Mouth/Throat:      Pharynx: No oropharyngeal exudate.   Eyes:      General: No scleral icterus.        Right eye: No discharge.         Left eye: No discharge.      Conjunctiva/sclera: Conjunctivae normal.      Pupils: Pupils are equal, round, and reactive to light.   Neck:      Vascular: No JVD.      Trachea: No tracheal deviation.   Cardiovascular:      Rate and Rhythm: Normal rate and regular rhythm.      Heart sounds: No murmur heard.     No friction rub. No gallop.   Pulmonary:      Effort: Pulmonary effort is normal. No respiratory distress.      Breath sounds: No stridor. Rhonchi present. No wheezing.   Chest:      Chest wall: No tenderness.   Abdominal:      General: Bowel sounds are normal. There is no distension.      Palpations: Abdomen is soft.      Tenderness: There is no abdominal tenderness. There is no rebound.   Musculoskeletal:         General: No tenderness.      Cervical back: Neck supple.   Skin:     General: Skin is warm and dry.      Nails: There is no clubbing.   Neurological:      Mental Status: He is alert and oriented to person, place, and time.      Cranial Nerves: No cranial nerve deficit.      Motor: No abnormal muscle tone.   Psychiatric:         Behavior: Behavior normal.  "        Laboratory:  Recent Labs     06/12/24  0853   WBC 16.7*   RBC 4.79   HEMOGLOBIN 14.9   HEMATOCRIT 40.9*   MCV 85.4   MCH 31.1   MCHC 36.4   RDW 36.2   PLATELETCT 245   MPV 9.5     Recent Labs     06/12/24  0853   SODIUM 136   POTASSIUM 3.7   CHLORIDE 103   CO2 17*   GLUCOSE 130*   BUN 19   CREATININE 1.12   CALCIUM 9.1     Recent Labs     06/12/24  0853   ALTSGPT 11   ASTSGOT 14   ALKPHOSPHAT 92   TBILIRUBIN 0.6   GLUCOSE 130*         No results for input(s): \"NTPROBNP\" in the last 72 hours.      Recent Labs     06/12/24  0853 06/12/24  1040   TROPONINT 9 8       Imaging:  CT-CTA CHEST PULMONARY ARTERY W/ RECONS   Final Result         1. There is a masslike area of consolidation in the lingula which may represent pneumonia. Clinical correlation and follow-up is suggested.   2. No evidence for pulmonary embolism.   3. Atherosclerosis including coronary artery disease.            DX-CHEST-PORTABLE (1 VIEW)   Final Result      Interval improvement in aeration of the lung bases.               Assessment/Plan:  Justification for Admission Status  I anticipate this patient will require at least two midnights for appropriate medical management, necessitating inpatient admission because pneumonia with sepsis and hypotension    Patient will need a Telemetry bed on MEDICAL service .  The need is secondary to pneumonia with sepsis and hypotension.    * Pneumonia, unspecified organism- (present on admission)  Assessment & Plan  I reviewed CTA with left-sided pneumonia with lingular consolidation  I reviewed viral PCR negative  I ordered IV fluids and IV Unasyn and azithromycin  I reviewed with the patient and his daughter that he would likely need repeat chest imaging to document resolution of consolidation noted on CT in 6 to 8 weeks    Essential tremor  Assessment & Plan  Continue home dose of Topamax    Sepsis (HCC)  Assessment & Plan  This is Sepsis Present on admission  SIRS criteria identified on my evaluation " include: Tachypnea, with respirations greater than 20 per minute and Leukocytosis, with WBC greater than 12,000  Clinical indicators of end organ dysfunction include Hypotension with systolic blood pressure less than 90 or MAP less than 65  Source is pneumonia  Sepsis protocol initiated  Crystalloid Fluid Administration: Fluid resuscitation ordered per standard protocol - 30 mL/kg per current or ideal body weight  IV antibiotics as appropriate for source of sepsis  Reassessment: I have reassessed the patient's hemodynamic status    Hypotension  Assessment & Plan  Hold home BP meds  I ordered IV fluid resuscitation  Monitor blood pressure adjust accordingly    Pleuritic chest pain  Assessment & Plan  Secondary to pneumonia  CTA reviewed negative for PE  Troponin normal  EKG reviewed with no acute ischemic changes        VTE prophylaxis: enoxaparin ppx

## 2024-06-12 NOTE — ASSESSMENT & PLAN NOTE
I reviewed CTA with left-sided pneumonia with lingular consolidation  I reviewed viral PCR negative  I ordered IV fluids and IV Unasyn and azithromycin  I reviewed with the patient and his daughter that he would likely need repeat chest imaging to document resolution of consolidation noted on CT in 6 to 8 weeks

## 2024-06-12 NOTE — ASSESSMENT & PLAN NOTE
Secondary to pneumonia  CTA reviewed negative for PE  Troponin normal  EKG reviewed with no acute ischemic changes

## 2024-06-12 NOTE — ED PROVIDER NOTES
ED Provider Note    CHIEF COMPLAINT  Chief Complaint   Patient presents with    Chest Pain     L of chest starting last night, constant, non-radiating, described as 'stabbing', denies cardiac hx    Shortness of Breath     Starting last night, no respiratory distress while speaking full sentences, denies respiratory hx, denies recent cough    Pain     Generalized body 'pain', states it feels different than typical body aches associated with illness        EXTERNAL RECORDS REVIEWED  Outpatient Notes office visit to [facility] for cognitive issues and tremor, noted history of hypertension    HPI/ROS  LIMITATION TO HISTORY   Select: Language Burmese,  Used   OUTSIDE HISTORIAN(S):  Family who report duration of symptoms as below    Louis Candelario is a 73 y.o. male who presents with bodyaches and subsequent left-sided chest pain.  Patient has had some overall body aches over the last week, no known fever although he has had some mild cough.  Last night he began to have more localized and significant left sided lateral chest pain.  This is fairly constant although it is worse when he takes deep breath.  It is not exertional, does not really seem to be positional although he is not sure.  He has had no shortness of breath.  The pain does occasionally go to his left arm but no radiation to his back or ripping or tearing sensation.  No known fevers or chills.  He does endorse some generalized weakness as well.  No abdominal pain, nausea vomiting or diarrhea.  No dysuria.  No leg pain or swelling.  No headaches or rashes.    PAST MEDICAL HISTORY   has a past medical history of Hypertension.    SURGICAL HISTORY   has a past surgical history that includes nasal cauterization and hernia repair.    FAMILY HISTORY  No family history on file.    SOCIAL HISTORY  Social History     Tobacco Use    Smoking status: Never    Smokeless tobacco: Never   Vaping Use    Vaping status: Never Used   Substance  "and Sexual Activity    Alcohol use: Yes     Comment: Occasional    Drug use: No    Sexual activity: Not on file       CURRENT MEDICATIONS  Home Medications       Reviewed by Micheal Hallman R.N. (Registered Nurse) on 06/12/24 at 0841  Med List Status: Not Addressed     Medication Last Dose Status   ibuprofen (MOTRIN) 800 MG Tab  Active   oseltamivir (TAMIFLU) 75 MG Cap  Active                  Audit from Redirected Encounters    **Home medications have not yet been reviewed for this encounter**         ALLERGIES  No Known Allergies    PHYSICAL EXAM  VITAL SIGNS: BP 92/53   Pulse 80   Temp 37.5 °C (99.5 °F) (Temporal)   Resp (!) 24   Ht 1.575 m (5' 2\")   Wt 53.1 kg (117 lb)   SpO2 95%   BMI 21.40 kg/m²      Pulse ox interpretation: I interpret this pulse ox as normal.  Constitutional: Alert   HENT: No signs of trauma, Bilateral external ears normal, Nose normal.   Eyes: Pupils are equal and reactive, Conjunctiva normal, Non-icteric.   Neck: Normal range of motion, No tenderness, Supple, No stridor.   Cardiovascular: Regular rate and rhythm, no murmurs.   Thorax & Lungs: Diminished left base, No respiratory distress, No wheezing, No chest tenderness.   Abdomen: Bowel sounds normal, Soft, No tenderness, No masses, No pulsatile masses. No peritoneal signs.  Skin: Warm, Dry, No erythema, No rash.   Back: No bony tenderness, No CVA tenderness.   Extremities: Intact distal pulses, No edema, No tenderness, No cyanosis,  Negative Eduardo's sign.   Musculoskeletal: Good range of motion in all major joints. No tenderness to palpation or major deformities noted.   Neurologic: Alert , Normal motor function, Normal sensory function, No focal deficits noted.   Psychiatric: Affect normal, Judgment normal, Mood normal.               EKG/LABS  Results for orders placed or performed during the hospital encounter of 06/12/24   CBC with Differential   Result Value Ref Range    WBC 16.7 (H) 4.8 - 10.8 K/uL    RBC 4.79 4.70 - 6.10 " M/uL    Hemoglobin 14.9 14.0 - 18.0 g/dL    Hematocrit 40.9 (L) 42.0 - 52.0 %    MCV 85.4 81.4 - 97.8 fL    MCH 31.1 27.0 - 33.0 pg    MCHC 36.4 32.3 - 36.5 g/dL    RDW 36.2 35.9 - 50.0 fL    Platelet Count 245 164 - 446 K/uL    MPV 9.5 9.0 - 12.9 fL    Neutrophils-Polys 91.00 (H) 44.00 - 72.00 %    Lymphocytes 3.20 (L) 22.00 - 41.00 %    Monocytes 5.30 0.00 - 13.40 %    Eosinophils 0.00 0.00 - 6.90 %    Basophils 0.10 0.00 - 1.80 %    Immature Granulocytes 0.40 0.00 - 0.90 %    Nucleated RBC 0.00 0.00 - 0.20 /100 WBC    Neutrophils (Absolute) 15.22 (H) 1.82 - 7.42 K/uL    Lymphs (Absolute) 0.54 (L) 1.00 - 4.80 K/uL    Monos (Absolute) 0.89 (H) 0.00 - 0.85 K/uL    Eos (Absolute) 0.00 0.00 - 0.51 K/uL    Baso (Absolute) 0.02 0.00 - 0.12 K/uL    Immature Granulocytes (abs) 0.07 0.00 - 0.11 K/uL    NRBC (Absolute) 0.00 K/uL   Complete Metabolic Panel (CMP)   Result Value Ref Range    Sodium 136 135 - 145 mmol/L    Potassium 3.7 3.6 - 5.5 mmol/L    Chloride 103 96 - 112 mmol/L    Co2 17 (L) 20 - 33 mmol/L    Anion Gap 16.0 7.0 - 16.0    Glucose 130 (H) 65 - 99 mg/dL    Bun 19 8 - 22 mg/dL    Creatinine 1.12 0.50 - 1.40 mg/dL    Calcium 9.1 8.5 - 10.5 mg/dL    Correct Calcium 8.8 8.5 - 10.5 mg/dL    AST(SGOT) 14 12 - 45 U/L    ALT(SGPT) 11 2 - 50 U/L    Alkaline Phosphatase 92 30 - 99 U/L    Total Bilirubin 0.6 0.1 - 1.5 mg/dL    Albumin 4.4 3.2 - 4.9 g/dL    Total Protein 6.9 6.0 - 8.2 g/dL    Globulin 2.5 1.9 - 3.5 g/dL    A-G Ratio 1.8 g/dL   Troponins NOW   Result Value Ref Range    Troponin T 9 6 - 19 ng/L   Lactic Acid   Result Value Ref Range    Lactic Acid 0.9 0.5 - 2.0 mmol/L   ESTIMATED GFR   Result Value Ref Range    GFR (CKD-EPI) 69 >60 mL/min/1.73 m 2   EKG   Result Value Ref Range    Report       [facility]    Test Date:  2024-06-12  Pt Name:    KENNY NAJERAQUEROSELYN NAJERACULP        Department: ER  MRN:        [id number]                      Room:  Gender:     Male                          Technician: [id number]  :        1950                   Requested By:ER TRIAGE PROTOCOL  Order #:    [id number]                    Reading MD: JOVANNI MANNING MD    Measurements  Intervals                                Axis  Rate:       88                           P:          66  WA:         173                          QRS:        47  QRSD:       99                           T:          31  QT:         352  QTc:        426    Interpretive Statements  Sinus rhythm  Compared to ECG 2016 13:21:26  No significant changes  Electronically Signed On 2024 09:07:21 PDT by JOVANNI MANNING MD     POC CoV-2, FLU A/B, RSV by PCR   Result Value Ref Range    POC Influenza A RNA, PCR Negative Negative    POC Influenza B RNA, PCR Negative Negative    POC RSV, by PCR Negative Negative    POC SARS-CoV-2, PCR NotDetected        I have independently interpreted this EKG    RADIOLOGY/PROCEDURES   I have independently interpreted the diagnostic imaging associated with this visit and am waiting the final reading from the radiologist.   My preliminary interpretation is as follows:   CT-CTA CHEST PULMONARY ARTERY W/ RECONS   Final Result         1. There is a masslike area of consolidation in the lingula which may represent pneumonia. Clinical correlation and follow-up is suggested.   2. No evidence for pulmonary embolism.   3. Atherosclerosis including coronary artery disease.            DX-CHEST-PORTABLE (1 VIEW)   Final Result      Interval improvement in aeration of the lung bases.            Radiologist interpretation:  CT-CTA CHEST PULMONARY ARTERY W/ RECONS   Final Result         1. There is a masslike area of consolidation in the lingula which may represent pneumonia. Clinical correlation and follow-up is suggested.   2. No evidence for pulmonary embolism.   3. Atherosclerosis including coronary artery disease.            DX-CHEST-PORTABLE (1 VIEW)   Final Result      Interval improvement in aeration of the  lung bases.          COURSE & MEDICAL DECISION MAKING    ASSESSMENT, COURSE AND PLAN  Care Narrative: 9:05 AM  Patient is evaluated the bedside and chart is reviewed.  At this point differential includes pneumonia, viral infection, consideration for pulmonary embolus as well, seems less likely to be cardiac in nature although this was considered.  Consideration for electrolyte or metabolic derangement also.  He has no abdominal pain or vomiting suggestive of more intra-abdominal pathology.  I have ordered for diagnostic labs, sepsis bundle, his x-rays actually returned from triage with what appears to be a left-sided infiltrate so we will treat with antibiotics.  CTAs ordered as well is IV fluid, Toradol    Patient is reevaluated and updated patient and family on results, plan for hospitalization to which they are agreeable.  Blood pressure is improved with fluids    Hydration: Based on the patient's presentation of Tachycardia the patient was given IV fluids. IV Hydration was used because oral hydration was not as rapid as required. Upon recheck following hydration, the patient was improved.  Sepsis: Infection was suspected .925 (Time). Sepsis pathway was initiated. 30cc/kg bolus given. Antibiotics were given per protocol.           PROBLEMS MANAGED  # Pneumonia.  Patient presented with bodyaches cough and left-sided chest pain.  He does have a leukocytosis and left shift, x-ray and CT with findings consistent with pneumonia.  He is tachypneic but does not have an oxygen requirement currently.  He is started on Unasyn and azithromycin per protocol    # Sepsis.  Likely secondary to above.  No other focal symptoms suggestive of other etiology.  No lactic acidosis, did have 1 blood pressure of systolics 87 this has been improved on fluids.  He was started on sepsis protocol    # Chest pain.  Likely secondary to his pneumonia.  Did have some pleuritic pain, I did obtain a CTA without evidence of blood clot, negative  troponin, unremarkable ECG    DISPOSITION AND DISCUSSIONS  I have discussed management of the patient with the following physicians and NILSA's:  Dr Kwong for admission    Patient is admitted in guarded condition    FINAL DIAGNOSIS  1. Pneumonia of left lung due to infectious organism, unspecified part of lung    2. Sepsis, due to unspecified organism, unspecified whether acute organ dysfunction present (HCC)           Electronically signed by: Joni Stapleton M.D., 6/12/2024 9:05 AM

## 2024-06-12 NOTE — ED NOTES
Med Rec complete per patient's family at bedside   Allergies reviewed  Antibiotics in the past 30 days:no  Anticoagulant in past 14 days:no  Pharmacy patient utilizes:[facility] on [address]     Family member states today pt would've been taking Topiramate  25 mg (2 tabs a day) but did not take his morning dose today

## 2024-06-12 NOTE — ED TRIAGE NOTES
"Chief Complaint   Patient presents with    Chest Pain     L of chest starting last night, constant, non-radiating, described as 'stabbing', denies cardiac hx    Shortness of Breath     Starting last night, no respiratory distress while speaking full sentences, denies respiratory hx, denies recent cough    Pain     Generalized body 'pain', states it feels different than typical body aches associated with illness      Pt ambulatory to triage for above complaints, VSS on RA, GCS 15, NAD.    Pt returned to [facility]. Educated on triage process and to inform staff of any changes.     BP 94/59   Pulse 95   Temp 37.5 °C (99.5 °F) (Temporal)   Resp 18   Ht 1.575 m (5' 2\")   Wt 53.1 kg (117 lb)   SpO2 95%   BMI 21.40 kg/m²     "

## 2024-06-13 ENCOUNTER — PHARMACY VISIT (OUTPATIENT)
Dept: PHARMACY | Facility: MEDICAL CENTER | Age: 74
End: 2024-06-13
Payer: COMMERCIAL

## 2024-06-13 VITALS
SYSTOLIC BLOOD PRESSURE: 105 MMHG | HEART RATE: 73 BPM | RESPIRATION RATE: 18 BRPM | OXYGEN SATURATION: 96 % | BODY MASS INDEX: 21.3 KG/M2 | TEMPERATURE: 97.9 F | WEIGHT: 115.74 LBS | DIASTOLIC BLOOD PRESSURE: 51 MMHG | HEIGHT: 62 IN

## 2024-06-13 LAB
ANION GAP SERPL CALC-SCNC: 9 MMOL/L (ref 7–16)
BUN SERPL-MCNC: 20 MG/DL (ref 8–22)
CALCIUM SERPL-MCNC: 8.5 MG/DL (ref 8.5–10.5)
CHLORIDE SERPL-SCNC: 106 MMOL/L (ref 96–112)
CO2 SERPL-SCNC: 22 MMOL/L (ref 20–33)
CREAT SERPL-MCNC: 1.16 MG/DL (ref 0.5–1.4)
ERYTHROCYTE [DISTWIDTH] IN BLOOD BY AUTOMATED COUNT: 38.2 FL (ref 35.9–50)
GFR SERPLBLD CREATININE-BSD FMLA CKD-EPI: 67 ML/MIN/1.73 M 2
GLUCOSE SERPL-MCNC: 109 MG/DL (ref 65–99)
HCT VFR BLD AUTO: 36 % (ref 42–52)
HGB BLD-MCNC: 12.6 G/DL (ref 14–18)
MCH RBC QN AUTO: 31.1 PG (ref 27–33)
MCHC RBC AUTO-ENTMCNC: 35 G/DL (ref 32.3–36.5)
MCV RBC AUTO: 88.9 FL (ref 81.4–97.8)
PLATELET # BLD AUTO: 204 K/UL (ref 164–446)
PMV BLD AUTO: 9.5 FL (ref 9–12.9)
POTASSIUM SERPL-SCNC: 4.1 MMOL/L (ref 3.6–5.5)
RBC # BLD AUTO: 4.05 M/UL (ref 4.7–6.1)
SODIUM SERPL-SCNC: 137 MMOL/L (ref 135–145)
WBC # BLD AUTO: 8.2 K/UL (ref 4.8–10.8)

## 2024-06-13 PROCEDURE — 80048 BASIC METABOLIC PNL TOTAL CA: CPT

## 2024-06-13 PROCEDURE — 700105 HCHG RX REV CODE 258: Performed by: HOSPITALIST

## 2024-06-13 PROCEDURE — 700111 HCHG RX REV CODE 636 W/ 250 OVERRIDE (IP): Mod: JZ | Performed by: HOSPITALIST

## 2024-06-13 PROCEDURE — 85027 COMPLETE CBC AUTOMATED: CPT

## 2024-06-13 PROCEDURE — 36415 COLL VENOUS BLD VENIPUNCTURE: CPT

## 2024-06-13 PROCEDURE — RXMED WILLOW AMBULATORY MEDICATION CHARGE: Performed by: STUDENT IN AN ORGANIZED HEALTH CARE EDUCATION/TRAINING PROGRAM

## 2024-06-13 PROCEDURE — 700102 HCHG RX REV CODE 250 W/ 637 OVERRIDE(OP): Performed by: HOSPITALIST

## 2024-06-13 PROCEDURE — 99239 HOSP IP/OBS DSCHRG MGMT >30: CPT | Performed by: STUDENT IN AN ORGANIZED HEALTH CARE EDUCATION/TRAINING PROGRAM

## 2024-06-13 PROCEDURE — A9270 NON-COVERED ITEM OR SERVICE: HCPCS | Performed by: HOSPITALIST

## 2024-06-13 RX ORDER — AMOXICILLIN AND CLAVULANATE POTASSIUM 875; 125 MG/1; MG/1
1 TABLET, FILM COATED ORAL 2 TIMES DAILY
Qty: 10 TABLET | Refills: 0 | Status: ACTIVE | OUTPATIENT
Start: 2024-06-13 | End: 2024-06-18

## 2024-06-13 RX ADMIN — AMPICILLIN AND SULBACTAM 3 G: 1; 2 INJECTION, POWDER, FOR SOLUTION INTRAMUSCULAR; INTRAVENOUS at 09:47

## 2024-06-13 RX ADMIN — TOPIRAMATE 25 MG: 25 TABLET, FILM COATED ORAL at 05:43

## 2024-06-13 RX ADMIN — AMPICILLIN AND SULBACTAM 3 G: 1; 2 INJECTION, POWDER, FOR SOLUTION INTRAMUSCULAR; INTRAVENOUS at 04:07

## 2024-06-13 ASSESSMENT — FIBROSIS 4 INDEX: FIB4 SCORE: 1.24

## 2024-06-13 NOTE — PROGRESS NOTES
Bedside report received from off going RN: Iveth, assumed care of patient.     Fall Risk Score: LOW RISK  Fall risk interventions in place: Place yellow fall risk ID band on patient, Provide patient/family education based on risk assessment, Educate patient/family to call staff for assistance when getting out of bed, Place fall precaution signage outside patient door, and Place patient in room close to nursing station  Bed type: Regular (Lucas Score less than 17 interventions in place)  Patient on cardiac monitor: Yes  IVF/IV medications: Not Applicable   Oxygen: Room Air  Bedside sitter: Not Applicable   Isolation: Not applicable

## 2024-06-13 NOTE — CARE PLAN
The patient is Stable - Low risk of patient condition declining or worsening    Shift Goals  Clinical Goals: Abx, monitor VS/Labs  Patient Goals: get better  Family Goals: safety, comfort    Progress made toward(s) clinical / shift goals:    Problem: Knowledge Deficit - Standard  Goal: Patient and family/care givers will demonstrate understanding of plan of care, disease process/condition, diagnostic tests and medications  Outcome: Progressing     Problem: Hemodynamics  Goal: Patient's hemodynamics, fluid balance and neurologic status will be stable or improve  Outcome: Progressing       Patient is not progressing towards the following goals:

## 2024-06-13 NOTE — DIETARY
"Nutrition services: Day 1 of admit.  Louis Candelario is a 73 y.o. male with admitting DX of Pneumonia, unspecified organism   Consult received for malnutrition screening tool score of 2 for 2-13lb wt loss x 6 months and poor PO.    Visited pt, pt's wife and grandson at bedside. Family declined  services. Wife mostly talked about pt's PO intake w/ this RD as she makes pt's meals. Wife stated that he has had a decreased appetite x 6 months. Wife mentioned that pt has never been good about eating for the 40 years she's been  to him. However, for the last 6 months, his PO has gotten worse. She said that the pt has been eating 2 meals/day for the last 6 months. Though RD tried to ask questions to quantify how much less the pt has been eating for the last 6 months, unable to determine at this time. Per wife, pt has lost 12lbs x 6 months. Per report, pt has lost 9.4% of wt x 6 months (not clinically significant). RD and wife discussed foods that pt likes.     Pt consented to nutrition focused physical exam (NFPE). RD palpated temporalis muscle, buccal fat pads, acromion process/clavicle area, and upper arm area. Pt w/ moderate buccal fat loss (somewhat bony appearance in buccal fat area) and mild fat loss in upper arm area (some but not ample fat to pinch in between fingers).    Assessment:  Height: 157.5 cm (5' 2\")  Weight: 52.5 kg (115 lb 11.9 oz) via stand up scale 6/13   Body mass index is 21.17 kg/m²., BMI classification: WNL  Diet/Intake: cardiac; PO  x 2 meals.    Evaluation:   Per H&P: \"He has lost about 11 pounds unintentional over the past year he has followed up with his PCP and has been referred for screening colonoscopy.\"  Labs 6/13: Reviewed  Meds: Reviewed  Skin: No documented wounds, no edema  +BM: PTA  Per chart review, pt has not lost wt x ~ 7 months  Wt Readings from Last Encounters:   06/13/24 52.5 kg (115 lb 11.9 oz)   11/22/23 52.8 kg (116 lb 6.5 oz)     Malnutrition Risk: " Does not meet 2 ASPEN criteria at this time.    Recommendations/Plan:  Adjust food preferences per pt preference.   Encourage intake of >50% of meals  Document intake of all meals as % taken in ADL's to provide interdisciplinary communication across all shifts.   Monitor weight.  Nutrition rep will continue to see patient for ongoing meal and snack preferences.     RD available PRN per department policy.

## 2024-06-13 NOTE — DISCHARGE SUMMARY
Discharge Summary    CHIEF COMPLAINT ON ADMISSION  Chief Complaint   Patient presents with    Chest Pain     L of chest starting last night, constant, non-radiating, described as 'stabbing', denies cardiac hx    Shortness of Breath     Starting last night, no respiratory distress while speaking full sentences, denies respiratory hx, denies recent cough    Pain     Generalized body 'pain', states it feels different than typical body aches associated with illness        Reason for Admission  Chest Pain/SOB     Admission Date  6/12/2024    CODE STATUS  Prior    HPI & HOSPITAL COURSE  Louis Candelario is a 73 y.o. male who presented 6/12/2024 with history of hypertension essential tremor presented to the emergency department for evaluation of dyspnea and pleuritic chest pain.  Admitted with community-acquired pneumonia. CTA with left-sided pneumonia with lingular consolidation. Wbc 16, PCR negative.  Patient has been treated with IV Unasyn and IV fluid.  His hypotension resolved, leukocytosis resolved.  Patient has been on room air.  His symptoms improved.  He will be discharged with Augmentin for 5 days. the patient and his daughter were advised to repeat chest imaging to document resolution of consolidation noted on CT in 6 to 8 weeks     Therefore, he is discharged in good and stable condition to home with close outpatient follow-up.    The patient met 2-midnight criteria for an inpatient stay at the time of discharge.    Discharge Date  6/13/2024    FOLLOW UP ITEMS POST DISCHARGE  - Follow up with primary care physician in 1 week.     - Please take the medications as instructed    - Go to the local Emergency Department if you have any worsening condition.       DISCHARGE DIAGNOSES  Principal Problem:    Pneumonia, unspecified organism (POA: Yes)  Active Problems:    Pleuritic chest pain (POA: Unknown)    Hypotension (POA: Unknown)    Sepsis (HCC) (POA: Unknown)    Essential tremor (POA: Unknown)  Resolved  Problems:    * No resolved hospital problems. *      FOLLOW UP  No future appointments.  No follow-up provider specified.    MEDICATIONS ON DISCHARGE     Medication List        START taking these medications        Instructions   amoxicillin-clavulanate 875-125 MG Tabs  Commonly known as: Augmentin   Take 1 Tablet by mouth 2 times a day for 5 days.  Dose: 1 Tablet            CONTINUE taking these medications        Instructions   topiramate 25 MG Tabs  Commonly known as: Topamax   Take 25 mg by mouth every day.  Dose: 25 mg            STOP taking these medications      lisinopril-hydrochlorothiazide 20-25 MG per tablet  Commonly known as: Prinzide              Allergies  No Known Allergies    DIET  No orders of the defined types were placed in this encounter.      ACTIVITY  As tolerated.  Weight bearing as tolerated    CONSULTATIONS      PROCEDURES      LABORATORY  Lab Results   Component Value Date    SODIUM 137 06/13/2024    POTASSIUM 4.1 06/13/2024    CHLORIDE 106 06/13/2024    CO2 22 06/13/2024    GLUCOSE 109 (H) 06/13/2024    BUN 20 06/13/2024    CREATININE 1.16 06/13/2024        Lab Results   Component Value Date    WBC 8.2 06/13/2024    HEMOGLOBIN 12.6 (L) 06/13/2024    HEMATOCRIT 36.0 (L) 06/13/2024    PLATELETCT 204 06/13/2024        Total time of the discharge process exceeds 34 minutes.

## 2024-06-13 NOTE — PROGRESS NOTES
4 Eyes Skin Assessment Completed by BAKARI Cuevas and BAKARI Benavidez.    Head WDL  Ears WDL  Nose WDL  Mouth WDL  Neck WDL  Breast/Chest WDL  Shoulder Blades WDL  Spine WDL  (R) Arm/Elbow/Hand WDL  (L) Arm/Elbow/Hand WDL  Abdomen WDL  Groin WDL  Scrotum/Coccyx/Buttocks WDL  (R) Leg WDL  (L) Leg WDL  (R) Heel/Foot/Toe WDL  (L) Heel/Foot/Toe WDL          Devices In Places Tele Box      Interventions In Place Pillows    Possible Skin Injury No    Pictures Uploaded Into Epic N/A  Wound Consult Placed N/A  RN Wound Prevention Protocol Ordered No

## 2024-06-14 LAB
BACTERIA UR CULT: NORMAL
SIGNIFICANT IND 70042: NORMAL
SITE SITE: NORMAL
SOURCE SOURCE: NORMAL
